# Patient Record
Sex: MALE | Race: BLACK OR AFRICAN AMERICAN | NOT HISPANIC OR LATINO
[De-identification: names, ages, dates, MRNs, and addresses within clinical notes are randomized per-mention and may not be internally consistent; named-entity substitution may affect disease eponyms.]

---

## 2019-08-27 ENCOUNTER — APPOINTMENT (OUTPATIENT)
Dept: NEUROLOGY | Facility: CLINIC | Age: 39
End: 2019-08-27
Payer: MEDICAID

## 2019-08-27 VITALS
WEIGHT: 190 LBS | HEART RATE: 78 BPM | BODY MASS INDEX: 28.14 KG/M2 | SYSTOLIC BLOOD PRESSURE: 120 MMHG | DIASTOLIC BLOOD PRESSURE: 78 MMHG | HEIGHT: 69 IN | OXYGEN SATURATION: 98 %

## 2019-08-27 DIAGNOSIS — Z82.5 FAMILY HISTORY OF ASTHMA AND OTHER CHRONIC LOWER RESPIRATORY DISEASES: ICD-10-CM

## 2019-08-27 DIAGNOSIS — Z87.891 PERSONAL HISTORY OF NICOTINE DEPENDENCE: ICD-10-CM

## 2019-08-27 PROBLEM — Z00.00 ENCOUNTER FOR PREVENTIVE HEALTH EXAMINATION: Status: ACTIVE | Noted: 2019-08-27

## 2019-08-27 PROCEDURE — 99204 OFFICE O/P NEW MOD 45 MIN: CPT

## 2019-08-27 NOTE — CONSULT LETTER
[Dear  ___] : Dear  [unfilled], [Referral Letter:] : I am referring [unfilled] to you for further evaluation.  My most recent evaluation follows. [Referral Closing:] : Thank you very much for seeing this patient.  If you have any questions, please do not hesitate to contact me. [Sincerely,] : Sincerely, [FreeTextEntry3] : Osman Sam MD\par  Professor of Neurology\par  Director of Neurology Outreach Programs \par  [facility]\par

## 2019-08-27 NOTE — HISTORY OF PRESENT ILLNESS
[FreeTextEntry1] : Pr with damage to left ankle / foot \par  Had MRI head - reading says r/o MS \par  No neuro symptoms

## 2019-08-27 NOTE — PHYSICAL EXAM
[FreeTextEntry1] : Awake  and alert  [General Appearance - Alert] : alert [General Appearance - In No Acute Distress] : in no acute distress [Oriented To Time, Place, And Person] : oriented to person, place, and time [Impaired Insight] : insight and judgment were intact [Affect] : the affect was normal [Person] : oriented to person [Place] : oriented to place [Time] : oriented to time [Concentration Intact] : normal concentrating ability [Visual Intact] : visual attention was ~T not ~L decreased [Naming Objects] : no difficulty naming common objects [Repeating Phrases] : no difficulty repeating a phrase [Writing A Sentence] : no difficulty writing a sentence [Fluency] : fluency intact [Comprehension] : comprehension intact [Reading] : reading intact [Past History] : adequate knowledge of personal past history [Cranial Nerves Optic (II)] : visual acuity intact bilaterally,  visual fields full to confrontation, pupils equal round and reactive to light [Cranial Nerves Oculomotor (III)] : extraocular motion intact [Cranial Nerves Trigeminal (V)] : facial sensation intact symmetrically [Cranial Nerves Vestibulocochlear (VIII)] : hearing was intact bilaterally [Cranial Nerves Facial (VII)] : face symmetrical [Cranial Nerves Glossopharyngeal (IX)] : tongue and palate midline [Cranial Nerves Accessory (XI - Cranial And Spinal)] : head turning and shoulder shrug symmetric [Cranial Nerves Hypoglossal (XII)] : there was no tongue deviation with protrusion [Motor Tone] : muscle tone was normal in all four extremities [Motor Strength] : muscle strength was normal in all four extremities [No Muscle Atrophy] : normal bulk in all four extremities [Sensation Tactile Decrease] : light touch was intact [Abnormal Walk] : normal gait [Balance] : balance was intact [Past-pointing] : there was no past-pointing [Tremor] : no tremor present [2+] : Ankle jerk left 2+ [Plantar Reflex Right Only] : normal on the right [Plantar Reflex Left Only] : normal on the left [FreeTextEntry8] : Gait abnormal - using crutches with bandage on the left foot [Sclera] : the sclera and conjunctiva were normal [PERRL With Normal Accommodation] : pupils were equal in size, round, reactive to light, with normal accommodation [Extraocular Movements] : extraocular movements were intact [Outer Ear] : the ears and nose were normal in appearance [Oropharynx] : the oropharynx was normal [Neck Appearance] : the appearance of the neck was normal [Neck Cervical Mass (___cm)] : no neck mass was observed [Jugular Venous Distention Increased] : there was no jugular-venous distention [Thyroid Nodule] : there were no palpable thyroid nodules [Thyroid Diffuse Enlargement] : the thyroid was not enlarged [Full Pulse] : the pedal pulses are present [Edema] : there was no peripheral edema [No CVA Tenderness] : no ~M costovertebral angle tenderness [No Spinal Tenderness] : no spinal tenderness

## 2019-09-03 ENCOUNTER — OUTPATIENT (OUTPATIENT)
Dept: OUTPATIENT SERVICES | Facility: HOSPITAL | Age: 39
LOS: 1 days | End: 2019-09-03

## 2019-09-03 ENCOUNTER — APPOINTMENT (OUTPATIENT)
Dept: MRI IMAGING | Facility: CLINIC | Age: 39
End: 2019-09-03
Payer: MEDICAID

## 2019-09-03 PROCEDURE — 72141 MRI NECK SPINE W/O DYE: CPT | Mod: 26

## 2019-09-03 PROCEDURE — 72146 MRI CHEST SPINE W/O DYE: CPT | Mod: 26

## 2019-09-07 ENCOUNTER — LABORATORY RESULT (OUTPATIENT)
Age: 39
End: 2019-09-07

## 2019-09-11 LAB
ALBUMIN MFR SERPL ELPH: 58.4 %
ALBUMIN SERPL-MCNC: 4.5 G/DL
ALBUMIN/GLOB SERPL: 1.4 RATIO
ALPHA1 GLOB MFR SERPL ELPH: 3.8 %
ALPHA1 GLOB SERPL ELPH-MCNC: 0.3 G/DL
ALPHA2 GLOB MFR SERPL ELPH: 7.3 %
ALPHA2 GLOB SERPL ELPH-MCNC: 0.6 G/DL
B BURGDOR IGG+IGM SER QL IB: NORMAL
B-GLOBULIN MFR SERPL ELPH: 11.1 %
B-GLOBULIN SERPL ELPH-MCNC: 0.9 G/DL
DEPRECATED KAPPA LC FREE/LAMBDA SER: 1.77 RATIO
GAMMA GLOB FLD ELPH-MCNC: 1.5 G/DL
GAMMA GLOB MFR SERPL ELPH: 19.4 %
IGA SER QL IEP: 226 MG/DL
IGG SER QL IEP: 1584 MG/DL
IGM SER QL IEP: 108 MG/DL
INTERPRETATION SERPL IEP-IMP: NORMAL
KAPPA LC CSF-MCNC: 1.41 MG/DL
KAPPA LC SERPL-MCNC: 2.5 MG/DL
M PROTEIN SPEC IFE-MCNC: NORMAL
PROT SERPL-MCNC: 7.7 G/DL
PROT SERPL-MCNC: 7.7 G/DL

## 2019-09-16 ENCOUNTER — APPOINTMENT (OUTPATIENT)
Dept: NEUROLOGY | Facility: CLINIC | Age: 39
End: 2019-09-16
Payer: MEDICAID

## 2019-09-16 VITALS
BODY MASS INDEX: 28.14 KG/M2 | HEART RATE: 71 BPM | OXYGEN SATURATION: 96 % | WEIGHT: 190 LBS | DIASTOLIC BLOOD PRESSURE: 84 MMHG | SYSTOLIC BLOOD PRESSURE: 124 MMHG | HEIGHT: 69 IN

## 2019-09-16 PROCEDURE — 99213 OFFICE O/P EST LOW 20 MIN: CPT

## 2019-09-16 NOTE — ASSESSMENT
[FreeTextEntry1] : with findings on blood studies - r/o MS vs autoimmune disease \par \par  He will be admitted to [facility] for w/o

## 2019-09-16 NOTE — PHYSICAL EXAM
[General Appearance - In No Acute Distress] : in no acute distress [General Appearance - Alert] : alert [Oriented To Time, Place, And Person] : oriented to person, place, and time [Impaired Insight] : insight and judgment were intact [Person] : oriented to person [Affect] : the affect was normal [Place] : oriented to place [Time] : oriented to time [Concentration Intact] : normal concentrating ability [Visual Intact] : visual attention was ~T not ~L decreased [Naming Objects] : no difficulty naming common objects [Repeating Phrases] : no difficulty repeating a phrase [Writing A Sentence] : no difficulty writing a sentence [Fluency] : fluency intact [Comprehension] : comprehension intact [Reading] : reading intact [Past History] : adequate knowledge of personal past history [Cranial Nerves Optic (II)] : visual acuity intact bilaterally,  visual fields full to confrontation, pupils equal round and reactive to light [Cranial Nerves Oculomotor (III)] : extraocular motion intact [Cranial Nerves Trigeminal (V)] : facial sensation intact symmetrically [Cranial Nerves Facial (VII)] : face symmetrical [Cranial Nerves Vestibulocochlear (VIII)] : hearing was intact bilaterally [Cranial Nerves Glossopharyngeal (IX)] : tongue and palate midline [Cranial Nerves Accessory (XI - Cranial And Spinal)] : head turning and shoulder shrug symmetric [Cranial Nerves Hypoglossal (XII)] : there was no tongue deviation with protrusion [Motor Tone] : muscle tone was normal in all four extremities [Motor Strength] : muscle strength was normal in all four extremities [No Muscle Atrophy] : normal bulk in all four extremities [Sensation Tactile Decrease] : light touch was intact [Abnormal Walk] : normal gait [Balance] : balance was intact [Past-pointing] : there was no past-pointing [Tremor] : no tremor present [2+] : Ankle jerk left 2+ [Plantar Reflex Right Only] : normal on the right [Plantar Reflex Left Only] : normal on the left [Neck Appearance] : the appearance of the neck was normal [Jugular Venous Distention Increased] : there was no jugular-venous distention [Neck Cervical Mass (___cm)] : no neck mass was observed [Thyroid Diffuse Enlargement] : the thyroid was not enlarged [FreeTextEntry1] : Gait - Left foot brace  [Thyroid Nodule] : there were no palpable thyroid nodules

## 2019-09-17 ENCOUNTER — INPATIENT (INPATIENT)
Facility: HOSPITAL | Age: 39
LOS: 1 days | Discharge: HOME | DRG: 59 | End: 2019-09-19
Attending: INTERNAL MEDICINE | Admitting: INTERNAL MEDICINE
Payer: MEDICAID

## 2019-09-17 ENCOUNTER — RESULT REVIEW (OUTPATIENT)
Age: 39
End: 2019-09-17

## 2019-09-17 VITALS
OXYGEN SATURATION: 98 % | WEIGHT: 190.04 LBS | RESPIRATION RATE: 18 BRPM | SYSTOLIC BLOOD PRESSURE: 138 MMHG | HEART RATE: 76 BPM | TEMPERATURE: 98 F | DIASTOLIC BLOOD PRESSURE: 70 MMHG

## 2019-09-17 DIAGNOSIS — M35.00 SJOGREN SYNDROME, UNSPECIFIED: ICD-10-CM

## 2019-09-17 DIAGNOSIS — S02.609A FRACTURE OF MANDIBLE, UNSPECIFIED, INITIAL ENCOUNTER FOR CLOSED FRACTURE: Chronic | ICD-10-CM

## 2019-09-17 DIAGNOSIS — Z29.9 ENCOUNTER FOR PROPHYLACTIC MEASURES, UNSPECIFIED: ICD-10-CM

## 2019-09-17 DIAGNOSIS — Z91.89 OTHER SPECIFIED PERSONAL RISK FACTORS, NOT ELSEWHERE CLASSIFIED: ICD-10-CM

## 2019-09-17 DIAGNOSIS — S92.902A UNSPECIFIED FRACTURE OF LEFT FOOT, INITIAL ENCOUNTER FOR CLOSED FRACTURE: ICD-10-CM

## 2019-09-17 LAB
ALBUMIN SERPL ELPH-MCNC: 4.6 G/DL — SIGNIFICANT CHANGE UP (ref 3.3–5)
ALP SERPL-CCNC: 71 U/L — SIGNIFICANT CHANGE UP (ref 40–120)
ALT FLD-CCNC: 27 U/L — SIGNIFICANT CHANGE UP (ref 10–45)
ANA PAT FLD IF-IMP: ABNORMAL
ANA SER IF-ACNC: ABNORMAL
ANION GAP SERPL CALC-SCNC: 10 MMOL/L — SIGNIFICANT CHANGE UP (ref 5–17)
APPEARANCE CSF: CLEAR — SIGNIFICANT CHANGE UP
APPEARANCE SPUN FLD: COLORLESS — SIGNIFICANT CHANGE UP
APTT BLD: 34.1 SEC — SIGNIFICANT CHANGE UP (ref 27.5–36.3)
AQP4 H2O CHANNEL AB SERPL IA-ACNC: NEGATIVE
AST SERPL-CCNC: 27 U/L — SIGNIFICANT CHANGE UP (ref 10–40)
BASOPHILS # BLD AUTO: 0.03 K/UL — SIGNIFICANT CHANGE UP (ref 0–0.2)
BASOPHILS NFR BLD AUTO: 0.5 % — SIGNIFICANT CHANGE UP (ref 0–2)
BILIRUB SERPL-MCNC: 0.4 MG/DL — SIGNIFICANT CHANGE UP (ref 0.2–1.2)
BUN SERPL-MCNC: 9 MG/DL — SIGNIFICANT CHANGE UP (ref 7–23)
CALCIUM SERPL-MCNC: 9.4 MG/DL — SIGNIFICANT CHANGE UP (ref 8.4–10.5)
CHLORIDE SERPL-SCNC: 104 MMOL/L — SIGNIFICANT CHANGE UP (ref 96–108)
CO2 SERPL-SCNC: 25 MMOL/L — SIGNIFICANT CHANGE UP (ref 22–31)
COLOR CSF: SIGNIFICANT CHANGE UP
CREAT SERPL-MCNC: 0.83 MG/DL — SIGNIFICANT CHANGE UP (ref 0.5–1.3)
CSF PCR RESULT: SIGNIFICANT CHANGE UP
ENA SS-A AB SER IA-ACNC: 0.5 AL
ENA SS-B AB SER IA-ACNC: 1.5 AL
EOSINOPHIL # BLD AUTO: 0.18 K/UL — SIGNIFICANT CHANGE UP (ref 0–0.5)
EOSINOPHIL NFR BLD AUTO: 2.9 % — SIGNIFICANT CHANGE UP (ref 0–6)
EXTRA SST TUBE: SIGNIFICANT CHANGE UP
GLUCOSE CSF-MCNC: 60 MG/DL — SIGNIFICANT CHANGE UP (ref 40–70)
GLUCOSE SERPL-MCNC: 94 MG/DL — SIGNIFICANT CHANGE UP (ref 70–99)
GRAM STN FLD: SIGNIFICANT CHANGE UP
HCT VFR BLD CALC: 46 % — SIGNIFICANT CHANGE UP (ref 39–50)
HGB BLD-MCNC: 15.1 G/DL — SIGNIFICANT CHANGE UP (ref 13–17)
IMM GRANULOCYTES NFR BLD AUTO: 0.2 % — SIGNIFICANT CHANGE UP (ref 0–1.5)
INR BLD: 1.05 — SIGNIFICANT CHANGE UP (ref 0.88–1.16)
LYMPHOCYTES # BLD AUTO: 2.48 K/UL — SIGNIFICANT CHANGE UP (ref 1–3.3)
LYMPHOCYTES # BLD AUTO: 39.7 % — SIGNIFICANT CHANGE UP (ref 13–44)
MCHC RBC-ENTMCNC: 29.5 PG — SIGNIFICANT CHANGE UP (ref 27–34)
MCHC RBC-ENTMCNC: 32.8 GM/DL — SIGNIFICANT CHANGE UP (ref 32–36)
MCV RBC AUTO: 90 FL — SIGNIFICANT CHANGE UP (ref 80–100)
MONOCYTES # BLD AUTO: 0.59 K/UL — SIGNIFICANT CHANGE UP (ref 0–0.9)
MONOCYTES NFR BLD AUTO: 9.5 % — SIGNIFICANT CHANGE UP (ref 2–14)
NEUTROPHILS # BLD AUTO: 2.95 K/UL — SIGNIFICANT CHANGE UP (ref 1.8–7.4)
NEUTROPHILS # CSF: 0 % — SIGNIFICANT CHANGE UP (ref 0–6)
NEUTROPHILS NFR BLD AUTO: 47.2 % — SIGNIFICANT CHANGE UP (ref 43–77)
NRBC # BLD: 0 /100 WBCS — SIGNIFICANT CHANGE UP (ref 0–0)
NRBC NFR CSF: 0 /UL — SIGNIFICANT CHANGE UP (ref 0–5)
PCP SPEC-MCNC: SIGNIFICANT CHANGE UP
PLATELET # BLD AUTO: 276 K/UL — SIGNIFICANT CHANGE UP (ref 150–400)
POTASSIUM SERPL-MCNC: 4.3 MMOL/L — SIGNIFICANT CHANGE UP (ref 3.5–5.3)
POTASSIUM SERPL-SCNC: 4.3 MMOL/L — SIGNIFICANT CHANGE UP (ref 3.5–5.3)
PROT CSF-MCNC: 38 MG/DL — SIGNIFICANT CHANGE UP (ref 15–45)
PROT SERPL-MCNC: 8.3 G/DL — SIGNIFICANT CHANGE UP (ref 6–8.3)
PROTHROM AB SERPL-ACNC: 11.9 SEC — SIGNIFICANT CHANGE UP (ref 10–12.9)
RBC # BLD: 5.11 M/UL — SIGNIFICANT CHANGE UP (ref 4.2–5.8)
RBC # CSF: 2 /UL — HIGH (ref 0–0)
RBC # FLD: 12.2 % — SIGNIFICANT CHANGE UP (ref 10.3–14.5)
SODIUM SERPL-SCNC: 139 MMOL/L — SIGNIFICANT CHANGE UP (ref 135–145)
SPECIMEN SOURCE: SIGNIFICANT CHANGE UP
TUBE TYPE: SIGNIFICANT CHANGE UP
WBC # BLD: 6.24 K/UL — SIGNIFICANT CHANGE UP (ref 3.8–10.5)
WBC # FLD AUTO: 6.24 K/UL — SIGNIFICANT CHANGE UP (ref 3.8–10.5)

## 2019-09-17 PROCEDURE — 62270 DX LMBR SPI PNXR: CPT

## 2019-09-17 PROCEDURE — 99283 EMERGENCY DEPT VISIT LOW MDM: CPT

## 2019-09-17 PROCEDURE — 99222 1ST HOSP IP/OBS MODERATE 55: CPT | Mod: GC

## 2019-09-17 PROCEDURE — 99222 1ST HOSP IP/OBS MODERATE 55: CPT

## 2019-09-17 PROCEDURE — 77003 FLUOROGUIDE FOR SPINE INJECT: CPT | Mod: 26

## 2019-09-17 RX ORDER — NICOTINE POLACRILEX 2 MG
0 GUM BUCCAL
Qty: 0 | Refills: 0 | DISCHARGE

## 2019-09-17 RX ORDER — NICOTINE POLACRILEX 2 MG
2 GUM BUCCAL THREE TIMES A DAY
Refills: 0 | Status: DISCONTINUED | OUTPATIENT
Start: 2019-09-17 | End: 2019-09-19

## 2019-09-17 RX ORDER — ONDANSETRON 8 MG/1
4 TABLET, FILM COATED ORAL ONCE
Refills: 0 | Status: COMPLETED | OUTPATIENT
Start: 2019-09-17 | End: 2019-09-17

## 2019-09-17 RX ORDER — ACETAMINOPHEN 500 MG
650 TABLET ORAL EVERY 6 HOURS
Refills: 0 | Status: DISCONTINUED | OUTPATIENT
Start: 2019-09-17 | End: 2019-09-19

## 2019-09-17 RX ORDER — METOCLOPRAMIDE HCL 10 MG
10 TABLET ORAL ONCE
Refills: 0 | Status: COMPLETED | OUTPATIENT
Start: 2019-09-17 | End: 2019-09-17

## 2019-09-17 RX ADMIN — Medication 258 MILLIGRAM(S): at 12:02

## 2019-09-17 RX ADMIN — Medication 650 MILLIGRAM(S): at 18:47

## 2019-09-17 RX ADMIN — Medication 650 MILLIGRAM(S): at 18:42

## 2019-09-17 NOTE — H&P ADULT - HISTORY OF PRESENT ILLNESS
Mr. Gregorio is a 38YO male with PMH of L jaw fracture and wiring ~2012 and prior alcohol abuse (now in treatment) with withdrawal and 1 seizure who presented to the ED after being sent by Dr. Sam who saw him outpatient for brain lesions incidentally found on head CT in [facility] for a L foot fracture August 2019. 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 Other than the L foot fracture, he denied weakness, sensory loss, tingling, numbness, changes in vision, double vision, changes with urination, changes in mood, or any other symptoms. Endorsed mild nausea for the past 2-3 days. Otherwise ROS similarly negative.    In the ED VS T 98F, HR 73, /71, RR 18 with SpO2 98 on RA  Labs were all within normal limits.    He was started on 1g IV solumedrol and admitted for inpatient LP and workup.

## 2019-09-17 NOTE — ED PROVIDER NOTE - PHYSICAL EXAMINATION
VITAL SIGNS: I have reviewed nursing notes and confirm.  CONSTITUTIONAL: Well-developed; well-nourished; in no acute distress.  SKIN: Skin is warm and dry, no acute rash.  HEAD: Normocephalic; atraumatic.  EYES:  EOM intact; conjunctiva and sclera clear.  ENT: No nasal discharge; airway clear.  NECK: Supple; Voluntary FROM  CARD: well perfused Regular rate and rhythm.  RESP: Speaking full sentences, No respiratory distress  ABD: Soft; non-distended; non-tender; no rebound or guarding  EXT: Normal ROM. No cyanosis or edema.  NEURO: Alert, oriented. Grossly unremarkable.  PSYCH: Cooperative, appropriate.

## 2019-09-17 NOTE — H&P ADULT - NSHPSOCIALHISTORY_GEN_ALL_CORE
Former smoker 25 years, single cigarettes  Ocassional prior marijuana use  Former EtOH abuse, currently living and working in program for alcoholism tx  Denies current or former recreational drug use  No STIs, not currently sexually active Former smoker 25 years, single cigarettes  Occasional prior marijuana use  Former EtOH abuse, currently living and working in program for alcoholism tx  Denies current or former recreational drug use  No STIs, not currently sexually active

## 2019-09-17 NOTE — CONSULT NOTE ADULT - SUBJECTIVE AND OBJECTIVE BOX
39 year old male with alcohol abuse (now and treatment) and withdrawal now noted to have demyelinating lesions on MRI.    INCOMPLETE NOTE     SSA and ARELIS 1:320 as outpatient     ROS    PMH  PSH  SH  FH  Meds  All     VSS  Physical exam notable for the following pertinent positives/negatives:     Data reviewed, notable for 39 year old male with alcohol abuse (now and treatment) and withdrawal now noted to have demyelinating lesions on MRI. Patient underwent a work up by his outpatient neurologist, Dr. Sam, which revealed ARELIS 1:320 and SSB 1.5  Patient unable to be seen by me today as he was at IR undergoing a lumbar puncture. Will see him tomorrow to obtain further history.   Patient admitted today for an LP and pulse steroids.     ROS negative per chart review    PMH alcohol abuse and withdrawal   PSH jaw fracture repair   SH occasional marijuana, former EtOH abuse, denies IVDU   FH maternal grandmother w/ brain aneurysm   Meds started 1g medrol today, see med rec for additional   All NKDA     VSS  Physical exam notable for the following pertinent positives/negatives:   Unable to examined patient today, in IR - will see tomorrow     Data reviewed, notable for:  CBC WNL   CMP WNL   TP 8.3 Alb 4.6   LFTs WNL   MRI C Spine: foci of abnormal T2 prolongation in the intramedullary intradural cervical spinal cord at the C2 level inferiorly and mid C4 level suggestive of demyelinating disease such as multiple sclerosis. GERRY.

## 2019-09-17 NOTE — ED ADULT TRIAGE NOTE - CHIEF COMPLAINT QUOTE
" I was sent by MD Sam for a lumbar puncture, I had imaging which showed some lesions and that's why I was told to follow up with him"

## 2019-09-17 NOTE — ED PROVIDER NOTE - CLINICAL SUMMARY MEDICAL DECISION MAKING FREE TEXT BOX
Cecy discussed w/ terrell, recs admit serena, 1gm solumedrol qday, further eval pending. Concern MS, MM, rheumatoid disease, not consistent w/ stroke, ich, no e/o severe infection, sepsis, meningitis. Rheum to be inpt consult. No acute findings on exam. 39M pmh etoh abuse and sz, seen for outpt eval by Neuro w/ brain lesions on imaging, hx L foot fx and L jaw fx p/w brain lesion.   No n/v, no HA, no vision change, no cp, no sob, no unilateral weakness or numbness. No known hx of demyelinating disease. Cecy discussed w/ racanalli, recs admit serena, 1gm solumedrol qday, further eval pending. Concern MS, MM, rheumatoid disease, not consistent w/ stroke, ich, no e/o severe infection, sepsis, meningitis. Rheum to be inpt consult. No acute findings on exam.

## 2019-09-17 NOTE — H&P ADULT - PROBLEM SELECTOR PLAN 4
1) PCP   Contacted on Admission:  2) Date of Contact with PCP:  3) PCP Contacted at Discharge: (Y/N)  4) Summary of Handoff Given to PCP:   5) Post-Discharge Appointment Date and Location: 1) PCP - no PCP at this time  Contacted on Admission:  2) Date of Contact with PCP:  3) PCP Contacted at Discharge: (Y/N)  4) Summary of Handoff Given to PCP:   5) Post-Discharge Appointment Date and Location:

## 2019-09-17 NOTE — CONSULT NOTE ADULT - ASSESSMENT
39 year old male with alcohol abuse (now and treatment) and withdrawal now noted to have demyelinating lesions on MRI. Patient underwent a work up by his outpatient neurologist, Dr. Sam, which revealed ARELIS 1:320 and SSB 1.5  Patient unable to be seen by me today as he was at IR undergoing a lumbar puncture. Will see him tomorrow to obtain further history.   Patient admitted today for an LP and pulse steroids.   Unable to examined patient today for more systemic signs of sjogren's syndrome.   His labs are not suggestive at this time of any active disease, with no protein gap or lymphopenia/leukopenia.     Would re check other markers of disease at this time including: IgA/IgG/IgM, C3/C4, and re check ARELIS, ssa/ssb, COREY, dsDNA, IgG4 subtypes, ACE, 25OHD, 1,25OHD  In order to make a definitive diagnosis of sjogren's prior to committing him to long term immunosuppressive therapy for treatment of these demyelinating lesions secondary to sjogren's, would suggest a lip biopsy to obtain a focus score   Agree with LP to evaluate for elevated protein and lymphocytic pleocytosis   OK to continue pulse for treatment of demyelinating lesions on MRI if no concern for infectious cause, but at this time would not make a presumptive diagnosis of Sjogren's syndrome based on the available data.     D/w Dr. Sam   Call with questions

## 2019-09-17 NOTE — H&P ADULT - PROBLEM SELECTOR PLAN 1
- IR consulted for LP  - Neurology consulted  - Rheumatology consulted Incidental lesions on CT at OSH, with subsequent findings on MRI suspicous for demyelinating process. Has been following with Dr. Sam as outpatient neurologist.   - started on IV solumedrol upon admission  - Neurology (Dr. Sam) following as inpatient  - IR performed LP after admission  - Rheumatology consulted  - f/u CSF results  - f/u rheumatology recommendations

## 2019-09-17 NOTE — ED PROVIDER NOTE - OBJECTIVE STATEMENT
39M pmh etoh abuse and sz, seen for outpt eval by Neuro w/ brain lesions on imaging, hx L foot fx and L jaw fx p/w brain lesion.   No n/v, no HA, no vision change, no cp, no sob, no unilateral weakness or numbness. No known hx of demyelinating disease.

## 2019-09-17 NOTE — H&P ADULT - PROBLEM SELECTOR PLAN 3
F: none  E: replete as needed for K <4, Mg <2  N: ____ diet   GI ppx: N/A  VTE/DVT ppx:  Pain:   Code status:  Dispo: F: none  E: replete as needed for K <4, Mg <2  N: Regular diet   GI ppx: N/A  VTE/DVT ppx: N/A  Pain: tylenol PRN  Code status: Full code  Dispo: GILBERTO

## 2019-09-17 NOTE — ED ADULT NURSE NOTE - OBJECTIVE STATEMENT
Patient presents to the ED sent for admission by Dr. Sam.  Patient states that he fractured his heel to his left foot a few months ago after a fall and they did a scan of his head which showed some lesions in his brain.  Patient states that he followed up with neurology and now they want him to get a spinal tap.  Patient denies any numbness/tingling weakness.  AA&OX3, respirations unlabored, non-diaphoretic, no pallor.  walking ortho boot to left foot.  Patient walking with a cane.

## 2019-09-17 NOTE — CHART NOTE - NSCHARTNOTEFT_GEN_A_CORE
Procedure: lumbar puncture    Indication: abnormal head CT, s/p fall with left leg fx      Clinical History:  38 yo M with of recent fall s/p left leg fx and with abnormal head imaging (?) who is referred to IR for lumbar puncture    Meds:    Allergies:No Known Allergies        Labs:                           15.1   6.24  )-----------( 276      ( 17 Sep 2019 11:49 )             46.0     PT/INR - ( 17 Sep 2019 11:49 )   PT: 11.9 sec;   INR: 1.05          PTT - ( 17 Sep 2019 11:49 )  PTT:34.1 sec  09-17    139  |  104  |  9   ----------------------------<  94  4.3   |  25  |  0.83    Ca    9.4      17 Sep 2019 11:49    TPro  8.3  /  Alb  4.6  /  TBili  0.4  /  DBili  x   /  AST  27  /  ALT  27  /  AlkPhos  71  09-17        Physical Exam:  AAO, conversant and consentable  Dysphagia (slight)  lef leg foot boot noted  NAD on RA    Anesthesia: lidocaine    ASA: 1    NPO: no

## 2019-09-17 NOTE — ED PROVIDER NOTE - NOTES
At bedside, gives add'l hx & access to [employer], notes multiple demyelinating lesions & lab abnormalities. Final recs pending. [phone]

## 2019-09-17 NOTE — H&P ADULT - ASSESSMENT
Mr. Gregorio is a 38YO male with no relevant PMH who presents asymptomatically with findings on CT and MRI suspicious for demyelinating disorder found in the setting of a L foot fracture. Mr. Gregorio is a 40YO male with no relevant PMH who presents asymptomatically with findings on CT and MRI suspicious for demyelinating disorder vs other found in the setting of a L foot fracture. Mr. Gregorio is a 38YO male with no relevant PMH who presents asymptomatically with incidental findings on CT and MRI suspicious for demyelinating disorder vs other in the setting of a L foot fracture.

## 2019-09-17 NOTE — H&P ADULT - PROBLEM SELECTOR PLAN 2
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 Could no give exact date or name of fracture. He was seen at [facility] and had a boot placed, no surgical intervention.  - f/u foot and ankle xrays  - consult orthopedics

## 2019-09-17 NOTE — H&P ADULT - NSHPPHYSICALEXAM_GEN_ALL_CORE
PHYSICAL EXAM  General: resting comfortably in bed in NAD  Mental Status: awake, A&O to person, place, date, and situation  HEENT: NC/AT, no rhinorrhea, no oropharyngeal exudates or erythema, MMM  Neck: supple, no JVD b/l  Cardiovascular: +S1/S2 with RRR. no M/R/G appreciated  Respiratory: CTA b/l, no W/R/R appreciated, no respiratory distress or accessory muscle use  Abdominal: +BS x4, NT/ND, no rigidity, no guarding,   Genitourinary: deferred   Extremities: UE: WWP, no clubbing, no cyanosis. LE: no edema, symmetric, non-tender  Vascular: 2+ radial pulses b/l; 1+ DP pulses b/l  Neurological: PERRL, EOMI with both horizontal and vertical nystagmus noted, CN II-XII grossly intact, L facial twitch   - Motor: 5/5 UE b/l. LE 5/5 RLE, LLE proximal 5/5 distal unable to fully examine as wearing boot.   Sensory: intact and equal to light touch UE & LE b/l.     Psychiatric: verbalizations and behaviors are nonbizarre and appropriate  Dermatological: skin dry and intact, no rashes, bruising, scars appreciated  Lymphatic: no submandibular or cervical LAD appreciated

## 2019-09-17 NOTE — CONSULT NOTE ADULT - SUBJECTIVE AND OBJECTIVE BOX
Patient is a 39y old  Male who presents with a chief complaint of left leg symptoms - s/p Fx    abnormal blood and MRI head       NO new numbness tingling visual  symptoms        Allergies  No Known Allergies      Health Issues  WEAKNESS  MEWS Score  No pertinent past medical history  Weakness  MED EVAL        FAMILY HISTORY:      MEDICATIONS  (STANDING):  methylPREDNISolone sodium succinate IVPB 1000 milliGRAM(s) IV Intermittent Once    MEDICATIONS  (PRN):      PAST MEDICAL & SURGICAL HISTORY:  No pertinent past medical history      Labs              Radiology:    Physical Exam    MENTAL STATUS  -Level of Consciousness- awake    Orientation- person, place time  Language- aphasia/ dysarthria- alert   Memory- recent and remote- nl      Cranial Nerve 1- 12  Pupils- equal and reactive  Eye movements- full   Facial - no asymmetry   Lower CN-nl    Gait and Station- no foot drop    MOTOR  Upper-nl  Lower-nl    Reflexes- nl    Sensation- nl    Cerebellar- nl    vascular - no bruits    Assessment- R/o MS     Plan IV solumedrol , LP

## 2019-09-17 NOTE — H&P ADULT - NSHPLABSRESULTS_GEN_ALL_CORE
LABS:              15.1   6.24  )-----------( 276      ( 17 Sep 2019 11:49 )             46.0     09-17  139  |  104  |  9   ----------------------------<  94  4.3   |  25  |  0.83    Ca    9.4      17 Sep 2019 11:49    TPro  8.3  /  Alb  4.6  /  TBili  0.4  /  DBili  x   /  AST  27  /  ALT  27  /  AlkPhos  71  09-17    PT/INR - ( 17 Sep 2019 11:49 )   PT: 11.9 sec;   INR: 1.05     PTT - ( 17 Sep 2019 11:49 )  PTT:34.1 sec      RADIOLOGY, EKG & ADDITIONAL TESTS: Reviewed.      < from: MR Cervical Spine No Cont (09.03.19 @ 17:41) >    IMPRESSION:    1. Foci of abnormal T2 prolongation in the intramedullary intradural   cervical spinal cord at the C2 level inferiorly and mid C4 level    suggestive of demyelinating disease such as multiple sclerosis.   Postcontrast MR imaging is recommended to assess for active demyelination.  2. Multilevel degenerative disc disease including broad-based central   disc herniation C6-C7 level and diffuse disc-osteophyte complex from   C3-C4 through to the C5-C6 level inclusive.  3. No central canal stenosis.  4. Multilevel foraminal narrowing, as above.  5. Reversal of normal cervical lordosis.    < end of copied text > LABS:              15.1   6.24  )-----------( 276      ( 17 Sep 2019 11:49 )             46.0     09-17  139  |  104  |  9   ----------------------------<  94  4.3   |  25  |  0.83    Ca    9.4      17 Sep 2019 11:49    TPro  8.3  /  Alb  4.6  /  TBili  0.4  /  DBili  x   /  AST  27  /  ALT  27  /  AlkPhos  71  09-17    PT/INR - ( 17 Sep 2019 11:49 )   PT: 11.9 sec;   INR: 1.05     PTT - ( 17 Sep 2019 11:49 )  PTT:34.1 sec      RADIOLOGY, EKG & ADDITIONAL TESTS: Reviewed.      < from: MR Cervical Spine No Cont (09.03.19 @ 17:41) >    IMPRESSION:    1. Foci of abnormal T2 prolongation in the intramedullary intradural   cervical spinal cord at the C2 level inferiorly and mid C4 level    suggestive of demyelinating disease such as multiple sclerosis.   Postcontrast MR imaging is recommended to assess for active demyelination.  2. Multilevel degenerative disc disease including broad-based central   disc herniation C6-C7 level and diffuse disc-osteophyte complex from   C3-C4 through to the C5-C6 level inclusive.  3. No central canal stenosis.  4. Multilevel foraminal narrowing, as above.  5. Reversal of normal cervical lordosis.

## 2019-09-17 NOTE — CHART NOTE - NSCHARTNOTEFT_GEN_A_CORE
PROCEDURE: Image guided lumbar puncture    PRIMARY SURGEON: Dr. Álvarez    ASSISTANT: Dr. Mitchell and Dr. Trejo    INDICATION: Multiple sclerosis/ demyelinating disorder    EBL: 0cc    ANESTHESIA: local     COMPLICATIONS: No immediate complications.     SPECIMENS: 5 vials of CSF     FINDINGS: Clear CSF     GENERAL CONDITION OF PATIENT: NAD, Alert    POSTOPERATIVE DIAGNOSIS: Lumbar puncture    DISPOSITION: Emergency room

## 2019-09-18 ENCOUNTER — TRANSCRIPTION ENCOUNTER (OUTPATIENT)
Age: 39
End: 2019-09-18

## 2019-09-18 LAB
24R-OH-CALCIDIOL SERPL-MCNC: 23.3 NG/ML — LOW (ref 30–80)
ANION GAP SERPL CALC-SCNC: 8 MMOL/L — SIGNIFICANT CHANGE UP (ref 5–17)
ANTI-RIBONUCLEAR PROTEIN: 0.5 AI — SIGNIFICANT CHANGE UP
BUN SERPL-MCNC: 14 MG/DL — SIGNIFICANT CHANGE UP (ref 7–23)
C3 SERPL-MCNC: 127 MG/DL — SIGNIFICANT CHANGE UP (ref 81–157)
C4 SERPL-MCNC: 27 MG/DL — SIGNIFICANT CHANGE UP (ref 13–39)
CALCIUM SERPL-MCNC: 9 MG/DL — SIGNIFICANT CHANGE UP (ref 8.4–10.5)
CHLORIDE SERPL-SCNC: 103 MMOL/L — SIGNIFICANT CHANGE UP (ref 96–108)
CO2 SERPL-SCNC: 26 MMOL/L — SIGNIFICANT CHANGE UP (ref 22–31)
CREAT SERPL-MCNC: 0.81 MG/DL — SIGNIFICANT CHANGE UP (ref 0.5–1.3)
DSDNA AB FLD-ACNC: 1.6 AI — HIGH
ENA SM AB FLD QL: <0.2 AI — SIGNIFICANT CHANGE UP
ENA SS-A AB FLD IA-ACNC: 0.5 AI — SIGNIFICANT CHANGE UP
EXTRA RED TOP TUBE: SIGNIFICANT CHANGE UP
GLUCOSE SERPL-MCNC: 110 MG/DL — HIGH (ref 70–99)
HCT VFR BLD CALC: 40.1 % — SIGNIFICANT CHANGE UP (ref 39–50)
HGB BLD-MCNC: 13.3 G/DL — SIGNIFICANT CHANGE UP (ref 13–17)
IGA FLD-MCNC: 240 MG/DL — SIGNIFICANT CHANGE UP (ref 84–499)
IGG FLD-MCNC: 1716 MG/DL — HIGH (ref 610–1660)
IGM SERPL-MCNC: 116 MG/DL — SIGNIFICANT CHANGE UP (ref 35–242)
KAPPA LC SER QL IFE: 1.89 MG/DL — SIGNIFICANT CHANGE UP (ref 0.33–1.94)
KAPPA/LAMBDA FREE LIGHT CHAIN RATIO, SERUM: 2.03 RATIO — HIGH (ref 0.26–1.65)
LAMBDA LC SER QL IFE: 0.93 MG/DL — SIGNIFICANT CHANGE UP (ref 0.57–2.63)
MCHC RBC-ENTMCNC: 29 PG — SIGNIFICANT CHANGE UP (ref 27–34)
MCHC RBC-ENTMCNC: 33.2 GM/DL — SIGNIFICANT CHANGE UP (ref 32–36)
MCV RBC AUTO: 87.4 FL — SIGNIFICANT CHANGE UP (ref 80–100)
NRBC # BLD: 0 /100 WBCS — SIGNIFICANT CHANGE UP (ref 0–0)
PLATELET # BLD AUTO: 258 K/UL — SIGNIFICANT CHANGE UP (ref 150–400)
POTASSIUM SERPL-MCNC: 3.9 MMOL/L — SIGNIFICANT CHANGE UP (ref 3.5–5.3)
POTASSIUM SERPL-SCNC: 3.9 MMOL/L — SIGNIFICANT CHANGE UP (ref 3.5–5.3)
RBC # BLD: 4.59 M/UL — SIGNIFICANT CHANGE UP (ref 4.2–5.8)
RBC # FLD: 12.3 % — SIGNIFICANT CHANGE UP (ref 10.3–14.5)
SODIUM SERPL-SCNC: 137 MMOL/L — SIGNIFICANT CHANGE UP (ref 135–145)
VIT D25+D1,25 OH+D1,25 PNL SERPL-MCNC: 80.7 PG/ML — HIGH (ref 19.9–79.3)
WBC # BLD: 16.82 K/UL — HIGH (ref 3.8–10.5)
WBC # FLD AUTO: 16.82 K/UL — HIGH (ref 3.8–10.5)

## 2019-09-18 PROCEDURE — 71045 X-RAY EXAM CHEST 1 VIEW: CPT | Mod: 26

## 2019-09-18 PROCEDURE — 99232 SBSQ HOSP IP/OBS MODERATE 35: CPT

## 2019-09-18 RX ADMIN — Medication 516 MILLIGRAM(S): at 11:43

## 2019-09-18 NOTE — PROGRESS NOTE ADULT - ASSESSMENT
Mr. Gregorio is a 40YO male with no relevant PMH who presents asymptomatically with incidental findings on CT and MRI suspicious for demyelinating disorder vs other in the setting of a L foot fracture.

## 2019-09-18 NOTE — PROGRESS NOTE ADULT - SUBJECTIVE AND OBJECTIVE BOX
GAYATRI ZEE  39y  Male    Patient is a 39y old  Male who presents with a chief complaint of w/u for MS/demyelinating d/o (17 Sep 2019 16:41)      INTERVAL HPI/OVERNIGHT EVENTS: Patient interviewed and examined at bedside. Patient has no complaints of weakness, altered sensation, N/V, CP, lightheadedness, or dizziness.     REVIEW OF SYSTEMS:  CONSTITUTIONAL: No fever, weight loss, or fatigue    T(C): 36.4 (09-18-19 @ 09:25), Max: 36.7 (09-17-19 @ 10:25)  HR: 83 (09-18-19 @ 09:25) (73 - 98)  BP: 108/51 (09-18-19 @ 09:25) (108/51 - 138/70)  RR: 16 (09-18-19 @ 09:25) (16 - 18)  SpO2: 96% (09-18-19 @ 09:25) (94% - 98%)  Wt(kg): --Vital Signs Last 24 Hrs  T(C): 36.4 (18 Sep 2019 09:25), Max: 36.7 (17 Sep 2019 10:25)  T(F): 97.5 (18 Sep 2019 09:25), Max: 98 (17 Sep 2019 10:25)  HR: 83 (18 Sep 2019 09:25) (73 - 98)  BP: 108/51 (18 Sep 2019 09:25) (108/51 - 138/70)  BP(mean): --  RR: 16 (18 Sep 2019 09:25) (16 - 18)  SpO2: 96% (18 Sep 2019 09:25) (94% - 98%)    PHYSICAL EXAM:  GENERAL: NAD  HEAD: Atraumatic, Normocephalic  EYES: EOMI, PERRLA, conjunctiva and sclera clear  NERVOUS SYSTEM: Alert & Oriented X3, Good concentration; Motor Strength 5/5 B/L upper and lower extremities; CN II-XII intact  CHEST/LUNG: Clear to auscultation bilaterally; No rales, rhonchi, wheezing, or rubs  HEART: Regular rate and rhythm; No murmurs, rubs, or gallops  ABDOMEN: Soft, Nontender, Nondistended; Bowel sounds present  EXTREMITIES: WWP, No clubbing, cyanosis, or edema. Walking boot on L foot   Vascular: 2+ peripheral pulses x4      Consultant(s) Notes Reviewed:  [x ] YES  [ ] NO  Care Discussed with Consultants/Other Providers [ x] YES  [ ] NO    LABS:                        15.1   6.24  )-----------( 276      ( 17 Sep 2019 11:49 )             46.0     09-17    139  |  104  |  9   ----------------------------<  94  4.3   |  25  |  0.83    Ca    9.4      17 Sep 2019 11:49    TPro  8.3  /  Alb  4.6  /  TBili  0.4  /  DBili  x   /  AST  27  /  ALT  27  /  AlkPhos  71  09-17      PT/INR - ( 17 Sep 2019 11:49 )   PT: 11.9 sec;   INR: 1.05          PTT - ( 17 Sep 2019 11:49 )  PTT:34.1 sec    CAPILLARY BLOOD GLUCOSE                RADIOLOGY & ADDITIONAL TESTS:    Imaging Personally Reviewed:  [ ] YES  [ ] NO    HEALTH ISSUES - PROBLEM Dx:  Transition of care performed with sharing of clinical summary: Transition of care performed with sharing of clinical summary  Prophylactic measure: Prophylactic measure  Foot fracture, left: Foot fracture, left

## 2019-09-18 NOTE — PROGRESS NOTE ADULT - ASSESSMENT
39 year old male with alcohol abuse (now and treatment) and withdrawal now noted to have demyelinating lesions on MRI. Patient underwent a work up by his outpatient neurologist, Dr. Sam, which revealed ARELIS 1:320 and SSB 1.5  Patient unable to be seen by me today as he was at IR undergoing a lumbar puncture. Will see him tomorrow to obtain further history. Patient admitted 9/17 for an LP and pulse steroids.   No sicca on history though his exam does demonstrate lacrimal enlargement and a dry mouth.   LP only with slight elevated IgG in s/o demyelinating lesions   His labs are not suggestive at this time of any active disease, with no protein gap or lymphopenia/leukopenia.     Awaiting the following: IgA/IgG/IgM, C3/C4, and re check ARELIS, ssa/ssb, COREY, dsDNA, IgG4 subtypes, ACE, 25OHD, 1,25OHD  In order to make a definitive diagnosis of sjogren's prior to committing him to long term immunosuppressive therapy for treatment of these demyelinating lesions secondary to sjogren's, would suggest a lip biopsy to obtain a focus score   OK to continue pulse for treatment of demyelinating lesions on MRI if no concern for infectious cause, but at this time would not make a presumptive diagnosis of Sjogren's syndrome based on the available data. Would recommend a lip biopsy going forward.     Call with questions

## 2019-09-18 NOTE — DISCHARGE NOTE PROVIDER - NSDCFUADDAPPT_GEN_ALL_CORE_FT
Please follow up with Dr. Flowers (rheumatologist) on Sept, 30th at 920am.     Please call Dr. Alex (neurologist) at [phone] to schedule an appointment.

## 2019-09-18 NOTE — PROGRESS NOTE ADULT - PROBLEM SELECTOR PLAN 1
Incidental lesions on CT at OSH, with subsequent findings on MRI suspicous for demyelinating process. Has been following with Dr. Sam as outpatient neurologist.   - IV Solu-medrol 1g Q24h (Day 2/3)  - Neurology (Dr. Sam) following as inpatient  - IR performed LP- Culture negative, CPR negative, glucose and protein WNL  - Rheumatology consulted  - Rheum labs ordered, f/u on results Incidental lesions on CT at OSH, with subsequent findings on MRI suspicous for demyelinating process. Has been following with Dr. Sam as outpatient neurologist.   - IV Solu-medrol 1g Q24h (Day 2/3)  - Neurology (Dr. Sam) following as inpatient  - IR performed LP- Culture negative, PCR negative, glucose and protein WNL  - Rheumatology consulted  - Rheum labs ordered, f/u on results

## 2019-09-18 NOTE — PROGRESS NOTE ADULT - PROBLEM SELECTOR PLAN 2
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 Could no give exact date or name of fracture. He was seen at [facility] and had a boot placed, no surgical intervention.  - f/u foot and ankle xrays from [facility]   - consult orthopedics as outpatient

## 2019-09-18 NOTE — DISCHARGE NOTE PROVIDER - NSDCCPCAREPLAN_GEN_ALL_CORE_FT
PRINCIPAL DISCHARGE DIAGNOSIS  Diagnosis: MS (multiple sclerosis)  Assessment and Plan of Treatment: You were found to have subclinical multiple sclerosis. This disease is caused by your own body attacking some of the cells in your brain. You have PRINCIPAL DISCHARGE DIAGNOSIS  Diagnosis: MS (multiple sclerosis)  Assessment and Plan of Treatment: You were found to have subclinical multiple sclerosis. This disease is caused by your own body attacking some of the cells in your brain. You have not had any episodes of multiple sclerosis (change in sensation, muscle strength or other bodily function) so this is considered subclincial multiple slcerosis. You will be on a 7 day taper of steroids ending on 9/26.

## 2019-09-18 NOTE — PROGRESS NOTE ADULT - SUBJECTIVE AND OBJECTIVE BOX
Neurology Follow up note    Name  GAYATRI GREGORIO    HPI:  Mr. Gregorio is a 38YO male with PMH of L jaw fracture and wiring ~2012 and prior alcohol abuse (now in treatment) with withdrawal and 1 seizure who presented to the ED after being sent by Dr. Sam who saw him outpatient for brain lesions incidentally found on head CT in [facility] for a L foot fracture August 2019. 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 Other than the L foot fracture, he denied weakness, sensory loss, tingling, numbness, changes in vision, double vision, changes with urination, changes in mood, or any other symptoms. Endorsed mild nausea for the past 2-3 days. Otherwise ROS similarly negative.    In the ED VS T 98F, HR 73, /71, RR 18 with SpO2 98 on RA  Labs were all within normal limits.    He was started on 1g IV solumedrol and admitted for inpatient LP and workup. (17 Sep 2019 12:04)      Interval History - s/p LP - no new neuro symptoms        REVIEW OF SYSTEMS    Vital Signs Last 24 Hrs  T(C): 36.4 (18 Sep 2019 09:25), Max: 36.7 (17 Sep 2019 12:42)  T(F): 97.5 (18 Sep 2019 09:25), Max: 98 (17 Sep 2019 12:42)  HR: 83 (18 Sep 2019 09:25) (73 - 98)  BP: 108/51 (18 Sep 2019 09:25) (108/51 - 133/71)  BP(mean): --  RR: 16 (18 Sep 2019 09:25) (16 - 18)  SpO2: 96% (18 Sep 2019 09:25) (94% - 96%)    Physical Exam-     Mental Status- awake and alert     Cranial Nerves-nl    Gait and station-nl    Motor-nl    Reflexes-nl    Sensation-nl    Coordination-nl    Vascular -nl    Medications  acetaminophen   Tablet .. 650 milliGRAM(s) Oral every 6 hours PRN  methylPREDNISolone sodium succinate IVPB 1000 milliGRAM(s) IV Intermittent daily  nicotine  Polacrilex Gum 2 milliGRAM(s) Oral three times a day PRN      Lab      Radiology    Assessment- Await LP results and rheumatology     Plan R/O MS

## 2019-09-18 NOTE — PROGRESS NOTE ADULT - PROBLEM SELECTOR PLAN 4
1) PCP - no PCP at this time  Contacted on Admission:  2) Date of Contact with PCP:  3) PCP Contacted at Discharge: (Y/N)  4) Summary of Handoff Given to PCP:   5) Post-Discharge Appointment Date and Location:

## 2019-09-18 NOTE — DISCHARGE NOTE PROVIDER - NSDCFUSCHEDAPPT_GEN_ALL_CORE_FT
GAYATRI ZEE ; 10/07/2019 ; [facility] [address] GAYATRI ZEE ; 09/30/2019 ; [facility] [address]  GAYATRI ZEE ; 10/07/2019 ; [facility] [address]

## 2019-09-18 NOTE — DISCHARGE NOTE PROVIDER - HOSPITAL COURSE
Patient is 38 yo M with past medical history of L jaw fx and wiring in 2012 and prior EtOH abuse (now in treatment) with withdrawal and one seizure and a foot fracture in August 2019.    Presented from Dr. Mendel's office after incidental brain lesions found on CTH at [facility], sent by Dr. Mendel for lumbar puncture and further workup.    Problem List/Main Diagnoses (system-based):         NEURO:    #R/O CNS demyelinating disorder    -Findings on CTH at [facility] found incidental lesions, f/u MRI showed lesions suspicious for a demyelinating process    -LP performed and found normal glucose, protein, negative PCR and negative culture    -Rheumatology testing found slightly elevated IgG and an increased kappa/lambda free chain ratio         MSK    #Foot fx    -Pt already in walking boot and was treated at [facility]         Inpatient treatment course: Patient admitted after incidental findings on CTH and MRI, sent to [facility] by Dr. Mendel. Patient has remained asymptomatic during his course. A lumbar puncture was performed, pertinent results as above. An extensive rheumatologic workup was done with pertinent results above. Patient was given a total of three-1000mg IV infusions of solu-medrol for swelling management.      New medications:     Labs to be followed outpatient:     Exam to be followed outpatient: Patient is 40 yo M with past medical history of L jaw fx and wiring in 2012 and prior EtOH abuse (now in treatment) with withdrawal and one seizure and a foot fracture in August 2019.    Presented from Dr. Mendel's office after incidental brain lesions found on CTH at [facility], sent by Dr. Mendel for lumbar puncture and further workup.    Problem List/Main Diagnoses (system-based):         NEURO:    #Subclinical MS    -Findings on CTH at [facility] found incidental lesions, f/u MRI showed lesions suspicious for a demyelinating process    -LP performed and found normal glucose, protein, negative PCR and negative culture. Oligoclonal bands were present on the lumbar tap.     -Rheumatology testing found slightly elevated IgG and an increased kappa/lambda free chain ratio as well as Anti SSB antibody, indicating the possibility of a Sjogren's diagnosis. Will need further workup for Sjogren's.           MSK    #Foot fx    -Pt already in walking boot and was treated at [facility]         Inpatient treatment course: Patient admitted after incidental findings on CTH and MRI, sent to [facility] by Dr. Mendel. Patient has remained asymptomatic during his course. A lumbar puncture was performed, pertinent results as above. An extensive rheumatologic workup was done with pertinent results above. Patient was given a total of three-1000mg IV infusions of solu-medrol for swelling management. It was found on lumbar tap that the    New medications: Prednisone taper ([id number])    Labs to be followed outpatient: Antibody panel and other rheumatological workup    Exam to be followed outpatient: Schirmer's test Patient is 40 yo M with past medical history of L jaw fx and wiring in 2012 and prior EtOH abuse (now in treatment) with withdrawal and one seizure and a foot fracture in August 2019.    Presented from Dr. Mendel's office after incidental brain lesions found on CTH at [facility], sent by Dr. Mendel for lumbar puncture and further workup.    Problem List/Main Diagnoses (system-based):         NEURO:    #Subclinical MS    -Findings on CTH at [facility] found incidental lesions, f/u MRI showed lesions suspicious for a demyelinating process    -LP performed and found normal glucose, protein, negative PCR and negative culture. Oligoclonal bands were present on the lumbar tap.     -Rheumatology testing found slightly elevated IgG and an increased kappa/lambda free chain ratio as well as Anti SSB antibody, indicating the possibility of a Sjogren's diagnosis. Will need further workup for Sjogren's.           MSK    #Foot fx    -Pt already in walking boot and was treated at [facility]         Inpatient treatment course: Patient admitted after incidental findings on CTH and MRI, sent to [facility] by Dr. Mendel. Patient has remained asymptomatic during his course. A lumbar puncture was performed, pertinent results as above. An extensive rheumatologic workup was done with pertinent results above. Patient was given a total of three-1000mg IV infusions of solu-medrol for swelling management. Oligoclonal bands were found on the lumbar puncture as well as increased IgG pointing towards MS. Since the patient has never experienced any episodes this is most likely subclinical MS.         New medications: Prednisone taper ([id number])    Labs to be followed outpatient: Antibody panel and other rheumatological workup    Exam to be followed outpatient: Schirmer's test

## 2019-09-18 NOTE — PROGRESS NOTE ADULT - SUBJECTIVE AND OBJECTIVE BOX
39 year old male with alcohol abuse (now and treatment) and withdrawal now noted to have demyelinating lesions on MRI. Patient underwent a work up by his outpatient neurologist, Dr. Sam, which revealed ARELIS 1:320 and SSB 1.5  Patient 9/17 today for an LP and pulse steroids.     Patient denies sicca symptoms  Further denies: photosensitive rashes, alopecia, arthritis, oral ulcers, history of pleuropericarditis, proteinuria/hematuria, cytopenias, unexplained fevers or weight loss, focal numbness or weakness.    VSS  Physical exam notable for the following pertinent positives/negatives:   Comfortable, NAD  No visible rashes  Dry mouth without any pooling of saliva, no parotid enlargement   Lacrimal enlargement, white sclera  CV RRR no mrg  Resp CTAB  GI soft, non tender, no HSM   No edema     Data reviewed, notable for:  CBC WNL   CMP WNL   TP 8.3 Alb 4.6   LFTs WNL   LP elevated IgG  MRI C Spine: foci of abnormal T2 prolongation in the intramedullary intradural cervical spinal cord at the C2 level inferiorly and mid C4 level suggestive of demyelinating disease such as multiple sclerosis. GERRY.

## 2019-09-18 NOTE — PROGRESS NOTE ADULT - PROBLEM SELECTOR PLAN 3
F: none  E: replete as needed for K <4, Mg <2  N: Regular diet   GI ppx: N/A  VTE/DVT ppx: N/A  Pain: tylenol PRN  Code status: Full code  Dispo: GILBERTO

## 2019-09-19 ENCOUNTER — TRANSCRIPTION ENCOUNTER (OUTPATIENT)
Age: 39
End: 2019-09-19

## 2019-09-19 VITALS
DIASTOLIC BLOOD PRESSURE: 56 MMHG | SYSTOLIC BLOOD PRESSURE: 128 MMHG | OXYGEN SATURATION: 96 % | TEMPERATURE: 98 F | RESPIRATION RATE: 18 BRPM | HEART RATE: 74 BPM

## 2019-09-19 PROBLEM — Z78.9 OTHER SPECIFIED HEALTH STATUS: Chronic | Status: ACTIVE | Noted: 2019-09-17

## 2019-09-19 LAB
ANION GAP SERPL CALC-SCNC: 8 MMOL/L — SIGNIFICANT CHANGE UP (ref 5–17)
ANTI-RIBONUCLEAR PROTEIN: 0.5 AI — SIGNIFICANT CHANGE UP
BUN SERPL-MCNC: 16 MG/DL — SIGNIFICANT CHANGE UP (ref 7–23)
C3 SERPL-MCNC: 126 MG/DL — SIGNIFICANT CHANGE UP (ref 81–157)
C4 SERPL-MCNC: 24 MG/DL — SIGNIFICANT CHANGE UP (ref 13–39)
CALCIUM SERPL-MCNC: 9 MG/DL — SIGNIFICANT CHANGE UP (ref 8.4–10.5)
CHLORIDE SERPL-SCNC: 106 MMOL/L — SIGNIFICANT CHANGE UP (ref 96–108)
CO2 SERPL-SCNC: 25 MMOL/L — SIGNIFICANT CHANGE UP (ref 22–31)
CREAT SERPL-MCNC: 0.9 MG/DL — SIGNIFICANT CHANGE UP (ref 0.5–1.3)
DSDNA AB FLD-ACNC: 1.3 AI — HIGH
ENA SM AB FLD QL: <0.2 AI — SIGNIFICANT CHANGE UP
ENA SS-A AB FLD IA-ACNC: 0.4 AI — SIGNIFICANT CHANGE UP
GLUCOSE SERPL-MCNC: 128 MG/DL — HIGH (ref 70–99)
HCT VFR BLD CALC: 41.5 % — SIGNIFICANT CHANGE UP (ref 39–50)
HGB BLD-MCNC: 13.5 G/DL — SIGNIFICANT CHANGE UP (ref 13–17)
IGG SERPL-MCNC: 1673 MG/DL — HIGH (ref 700–1600)
IGG1 SER-MCNC: 1242 MG/DL — HIGH (ref 248–810)
IGG2 SER-MCNC: 377 MG/DL — SIGNIFICANT CHANGE UP (ref 130–555)
IGG3 SER-MCNC: 105 MG/DL — HIGH (ref 15–102)
IGG4 SER-MCNC: 37 MG/DL — SIGNIFICANT CHANGE UP (ref 2–96)
MAGNESIUM SERPL-MCNC: 2.2 MG/DL — SIGNIFICANT CHANGE UP (ref 1.6–2.6)
MCHC RBC-ENTMCNC: 29.3 PG — SIGNIFICANT CHANGE UP (ref 27–34)
MCHC RBC-ENTMCNC: 32.5 GM/DL — SIGNIFICANT CHANGE UP (ref 32–36)
MCV RBC AUTO: 90.2 FL — SIGNIFICANT CHANGE UP (ref 80–100)
NON-GYNECOLOGICAL CYTOLOGY STUDY: SIGNIFICANT CHANGE UP
NRBC # BLD: 0 /100 WBCS — SIGNIFICANT CHANGE UP (ref 0–0)
OLIGOCLONAL BANDS CSF ELPH-IMP: PRESENT
PLATELET # BLD AUTO: 255 K/UL — SIGNIFICANT CHANGE UP (ref 150–400)
POTASSIUM SERPL-MCNC: 4.3 MMOL/L — SIGNIFICANT CHANGE UP (ref 3.5–5.3)
POTASSIUM SERPL-SCNC: 4.3 MMOL/L — SIGNIFICANT CHANGE UP (ref 3.5–5.3)
RBC # BLD: 4.6 M/UL — SIGNIFICANT CHANGE UP (ref 4.2–5.8)
RBC # FLD: 12.5 % — SIGNIFICANT CHANGE UP (ref 10.3–14.5)
SODIUM SERPL-SCNC: 139 MMOL/L — SIGNIFICANT CHANGE UP (ref 135–145)
VDRL CSF-TITR: NEGATIVE — SIGNIFICANT CHANGE UP
WBC # BLD: 15.63 K/UL — HIGH (ref 3.8–10.5)
WBC # FLD AUTO: 15.63 K/UL — HIGH (ref 3.8–10.5)

## 2019-09-19 PROCEDURE — 99232 SBSQ HOSP IP/OBS MODERATE 35: CPT

## 2019-09-19 RX ADMIN — Medication 516 MILLIGRAM(S): at 12:12

## 2019-09-19 NOTE — PROGRESS NOTE ADULT - SUBJECTIVE AND OBJECTIVE BOX
39 year old male with alcohol abuse (now and treatment) and withdrawal now noted to have demyelinating lesions on MRI. Patient underwent a work up by his outpatient neurologist, Dr. Sam, which revealed ARELIS 1:320 and SSB 1.5  Patient 9/17 today for an LP and pulse steroids.     Feels well today  S/p 3 day pulse of steroids  CSF + for oligoclonal bands     VSS  Physical exam notable for the following pertinent positives/negatives:   Comfortable, NAD  No visible rashes  Dry mouth without any pooling of saliva, no parotid enlargement   Lacrimal enlargement, white sclera  CV RRR no mrg  Resp CTAB  GI soft, non tender, no HSM   No edema     Data reviewed, notable for:  CBC WNL   CMP WNL   TP 8.3 Alb 4.6   LFTs WNL   LP elevated IgG  MRI C Spine: foci of abnormal T2 prolongation in the intramedullary intradural cervical spinal cord at the C2 level inferiorly and mid C4 level suggestive of demyelinating disease such as multiple sclerosis. GERRY.

## 2019-09-19 NOTE — DISCHARGE NOTE NURSING/CASE MANAGEMENT/SOCIAL WORK - NSDCPEEMAIL_GEN_ALL_CORE
[facility] for Tobacco Control email [email or web address]

## 2019-09-19 NOTE — PHYSICAL THERAPY INITIAL EVALUATION ADULT - GENERAL OBSERVATIONS, REHAB EVAL
Received semi-Bee's position in bed, eating breakfast with +heplock, on room air, in no apparent distress. Patient appears to be resting comfortably in bed

## 2019-09-19 NOTE — PHYSICAL THERAPY INITIAL EVALUATION ADULT - THERAPY FREQUENCY, PT EVAL
DC PT - patient displays independence for all functional mobility and ADLs including bed mobility, transfers, ambulation and stair negotiation without assistive device or physical external assistance provided. Able to self-don controlled-ankle motion boot and displays excellent dynamic stability with all aforementioned mobility. Patient is free to ambulate with nursing staff as patient is independent without acute inpatient skilled PT needs. DC PT

## 2019-09-19 NOTE — DISCHARGE NOTE NURSING/CASE MANAGEMENT/SOCIAL WORK - NSDCPEWEB_GEN_ALL_CORE
[facility] for Tobacco Control website --- [email or web address] website --- [email or web address]

## 2019-09-19 NOTE — PHYSICAL THERAPY INITIAL EVALUATION ADULT - CRITERIA FOR SKILLED THERAPEUTIC INTERVENTIONS
anticipated discharge recommendation/risk reduction/prevention/functional limitations in following categories/therapy frequency/rehab potential/impairments found

## 2019-09-19 NOTE — PROGRESS NOTE ADULT - REASON FOR ADMISSION
w/u for MS/demyelinating d/o
w/u for MS/demyelinating d/o
demyelinating disease
w/u for MS/demyelinating d/o

## 2019-09-19 NOTE — PHYSICAL THERAPY INITIAL EVALUATION ADULT - GAIT DEVIATIONS NOTED, PT EVAL
decreased stance time on LLE; increased hip flexion on LLE to compensate for decreased dorsiflexion secondary to CAM boot

## 2019-09-19 NOTE — PHYSICAL THERAPY INITIAL EVALUATION ADULT - DIAGNOSIS, PT EVAL
Patient is willing and motivated to participate in physical therapy and would like to continue healing Practice Pattern 5D: Impaired Motor Function and Sensory Integrity Associated with Nonprogressive Disorders of the Central Nervous System—Acquired in Adolescence or Adulthood

## 2019-09-19 NOTE — DISCHARGE NOTE NURSING/CASE MANAGEMENT/SOCIAL WORK - NSDCFUADDAPPT_GEN_ALL_CORE_FT
Please follow up with Dr. Flowers (rheumatologist) on Sept, 30th at 920am.     Please call Dr. Alex (neurologist) at [phone] to schedule an appointment.

## 2019-09-19 NOTE — PHYSICAL THERAPY INITIAL EVALUATION ADULT - ADDITIONAL COMMENTS
Patient lives alone in an elevator apartment building with no steps to enter. Prior to admission, patient was independent for all functional mobility and ADLs. Prior to admission, patient was ambulating with his L foot in a CAM boot due to calcaneus fracture in later 8/19

## 2019-09-19 NOTE — PROGRESS NOTE ADULT - ASSESSMENT
39 year old male with alcohol abuse (now and treatment) and withdrawal now noted to have demyelinating lesions on MRI. Patient underwent a work up by his outpatient neurologist, Dr. Sam, which revealed ARELIS 1:320 and SSB 1.5  Patient unable to be seen by me today as he was at IR undergoing a lumbar puncture. Will see him tomorrow to obtain further history. Patient admitted 9/17 for an LP and pulse steroids.   No sicca on history though his exam does demonstrate lacrimal enlargement and a dry mouth.   LP only with slight elevated IgG in s/o demyelinating lesions   His labs are not suggestive at this time of any active disease, with no protein gap or lymphopenia/leukopenia.     Awaiting additional serologies, will follow up    S/p pulse and transition to high dose steroids per Dr. Sam   Oligoclonal bands in CSF, thus likely a diagnosis of MS and concomitant Sjogren's less likely   Patient can follow up with me as outpatient, consider lip biopsy pending neuro eval     Patient should follow up with me 1-2 weeks after discharge  Case d/w Dr. Sam   Call with questions

## 2019-09-19 NOTE — PHYSICAL THERAPY INITIAL EVALUATION ADULT - PATIENT/FAMILY/SIGNIFICANT OTHER GOALS STATEMENT, PT EVAL
Patient is willing and motivated to participate in physical therapy Patient is willing and motivated to participate in physical therapy and would like to continue healing

## 2019-09-19 NOTE — PROGRESS NOTE ADULT - SUBJECTIVE AND OBJECTIVE BOX
PT. seen and examined at bed side.    Case discussed with medical team.    Case discussed with Consultants.    Orders reviewed.    Plan:as per orders.

## 2019-09-19 NOTE — PROGRESS NOTE ADULT - SUBJECTIVE AND OBJECTIVE BOX
Neurology Follow up note    Name  GAYATRI GREGORIO    HPI:  Mr. Gregorio is a 38YO male with PMH of L jaw fracture and wiring ~2012 and prior alcohol abuse (now in treatment) with withdrawal and 1 seizure who presented to the ED after being sent by Dr. Sam who saw him outpatient for brain lesions incidentally found on head CT in [facility] for a L foot fracture August 2019. 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 Other than the L foot fracture, he denied weakness, sensory loss, tingling, numbness, changes in vision, double vision, changes with urination, changes in mood, or any other symptoms. Endorsed mild nausea for the past 2-3 days. Otherwise ROS similarly negative.    In the ED VS T 98F, HR 73, /71, RR 18 with SpO2 98 on RA  Labs were all within normal limits.    He was started on 1g IV solumedrol and admitted for inpatient LP and workup. (17 Sep 2019 12:04)      Interval History - no change in the neurological symptoms - no new weakness, numbness or tingling         REVIEW OF SYSTEMS    Vital Signs Last 24 Hrs  T(C): 36.3 (19 Sep 2019 08:58), Max: 37.1 (18 Sep 2019 20:32)  T(F): 97.3 (19 Sep 2019 08:58), Max: 98.8 (18 Sep 2019 20:32)  HR: 78 (19 Sep 2019 08:58) (72 - 88)  BP: 121/70 (19 Sep 2019 08:58) (121/70 - 160/64)  BP(mean): --  RR: 18 (19 Sep 2019 08:58) (16 - 18)  SpO2: 96% (19 Sep 2019 08:58) (94% - 96%)    Physical Exam-     Mental Status- awake and alert     Cranial Nerves- full EOM    Gait and station- nl    Motor- nl    Reflexes- intact    Sensation- no sensory level    Coordination- no tremors    Vascular - no bruits    Medications  acetaminophen   Tablet .. 650 milliGRAM(s) Oral every 6 hours PRN  methylPREDNISolone sodium succinate IVPB 1000 milliGRAM(s) IV Intermittent daily  nicotine  Polacrilex Gum 2 milliGRAM(s) Oral three times a day PRN      Lab      Radiology    Assessment- Likely MS - he will FU with Dr Valles    R/O sjogrens - FU with Dr Flowers    Plan Tapering dose of steroids

## 2019-09-19 NOTE — DISCHARGE NOTE NURSING/CASE MANAGEMENT/SOCIAL WORK - PATIENT PORTAL LINK FT
You can access the FollowMyHealth Patient Portal offered by [facility] by registering at the following website: [email or web address]. By joining [facility]’s FollowMyHealth portal, you will also be able to view your health information using other applications (apps) compatible with our system.

## 2019-09-19 NOTE — PHYSICAL THERAPY INITIAL EVALUATION ADULT - MODALITIES TREATMENT COMMENTS
FIM locomotion 7; FIM stairs 7; smile symmetric; tongue protrusion midline; cheek puff symmetric; eyebrow raise symmetric; eyes opening/closing intact; facial sensation V1-3 intact bilaterally; hearing intact to finger rub bilaterally; shoulder shrug 5/5 bilaterally; -dysdiadochokinesia, +Babinski L foot; -Babinski R foot

## 2019-09-19 NOTE — PHYSICAL THERAPY INITIAL EVALUATION ADULT - PERTINENT HX OF CURRENT PROBLEM, REHAB EVAL
Patient is a 39 year old M with PMH of L jaw fracture and wiring ~2012 and prior alcohol abuse (now in treatment) with withdrawal and 1 seizure who presented to the ED after being sent by Dr. Sam who saw him outpatient for brain lesions incidentally found on head CT in [facility] for a L foot fracture August 2019.

## 2019-09-20 LAB
ACE SERPL-CCNC: 41 U/L — SIGNIFICANT CHANGE UP (ref 14–82)
ACE SERPL-CCNC: 43 U/L — SIGNIFICANT CHANGE UP (ref 14–82)
DSDNA AB SER-ACNC: <12 IU/ML — SIGNIFICANT CHANGE UP
DSDNA AB SER-ACNC: <12 IU/ML — SIGNIFICANT CHANGE UP
IGG SERPL-MCNC: 1469 MG/DL — SIGNIFICANT CHANGE UP (ref 700–1600)
IGG1 SER-MCNC: 1060 MG/DL — HIGH (ref 248–810)
IGG2 SER-MCNC: 326 MG/DL — SIGNIFICANT CHANGE UP (ref 130–555)
IGG3 SER-MCNC: 92 MG/DL — SIGNIFICANT CHANGE UP (ref 15–102)
IGG4 SER-MCNC: 27.3 MG/DL — SIGNIFICANT CHANGE UP (ref 2.4–121)
IGG4 SER-MCNC: 33 MG/DL — SIGNIFICANT CHANGE UP (ref 2–96)

## 2019-09-22 DIAGNOSIS — F10.239 ALCOHOL DEPENDENCE WITH WITHDRAWAL, UNSPECIFIED: ICD-10-CM

## 2019-09-22 DIAGNOSIS — Z87.891 PERSONAL HISTORY OF NICOTINE DEPENDENCE: ICD-10-CM
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
## 2019-09-22 DIAGNOSIS — G37.9 DEMYELINATING DISEASE OF CENTRAL NERVOUS SYSTEM, UNSPECIFIED: ICD-10-CM

## 2019-09-22 DIAGNOSIS — R53.1 WEAKNESS: ICD-10-CM

## 2019-09-22 DIAGNOSIS — G35 MULTIPLE SCLEROSIS: ICD-10-CM
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
## 2019-09-22 DIAGNOSIS — S92.902D UNSPECIFIED FRACTURE OF LEFT FOOT, SUBSEQUENT ENCOUNTER FOR FRACTURE WITH ROUTINE HEALING: ICD-10-CM

## 2019-09-22 LAB
ANA PAT FLD IF-IMP: ABNORMAL
ANA PAT FLD IF-IMP: ABNORMAL
ANA TITR SER: ABNORMAL
ANA TITR SER: ABNORMAL

## 2019-09-23 LAB
WNV RNA SPEC QL NAA+PROBE: SIGNIFICANT CHANGE UP
WNV RNA SPEC QL NAA+PROBE: SIGNIFICANT CHANGE UP

## 2019-09-24 LAB — MISCELLANEOUS TEST NAME: SIGNIFICANT CHANGE UP

## 2019-09-27 ENCOUNTER — LABORATORY RESULT (OUTPATIENT)
Age: 39
End: 2019-09-27

## 2019-09-27 ENCOUNTER — APPOINTMENT (OUTPATIENT)
Dept: NEUROLOGY | Facility: CLINIC | Age: 39
End: 2019-09-27
Payer: MEDICAID

## 2019-09-27 VITALS
SYSTOLIC BLOOD PRESSURE: 125 MMHG | TEMPERATURE: 98.4 F | BODY MASS INDEX: 29.62 KG/M2 | OXYGEN SATURATION: 98 % | HEIGHT: 69 IN | DIASTOLIC BLOOD PRESSURE: 79 MMHG | HEART RATE: 79 BPM | WEIGHT: 200 LBS

## 2019-09-27 PROCEDURE — 99215 OFFICE O/P EST HI 40 MIN: CPT

## 2019-09-27 PROCEDURE — 99354: CPT

## 2019-09-27 NOTE — HISTORY OF PRESENT ILLNESS
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 Went to ED at [facility], had CT of foot and also of CTH because concern for head trauma, which patient denies. CTH showed abnormalities which led to MRI brain showing lesions. Saw Dr. Sam. Got spine MRIs also showing lesions. Then admitted to [facility] for 3d of IVMP and LP. Pt remain asymptomatic.\par \par ROS/Current Sx:\par no sx.\par \par PMHX:\par seizure - 2008 and 2018, thought both to be related to etoh withdrawal when he stopped abruptly.\par alcoholism - sober since 2018.\par \par MEDS:\par vitC\par MV\par \par ALL: nkda\par \par SHx: past heavy etoh, now in rehab center and sober X1y. stopped tob 2 months ago. no drugs. used to be in construction, now working in kitchen.\par \par FHx: no MS, no neuro.\par \par Vitals: unremarkable\par \par Exam:\par \par AO3.  Normally conversant.  Follows commands, names, and repeats.  Good attention.\par \par PERRL, VFF, EOMI but with clear BL dissociated nystagmus, no nystagmus, face symmetric, TUP at midline.\par \par Motor: \par                                                 R:                               L:\par Del                                           5                                5\par Bi                                              5                               5\par Tri                                            5                               5\par Wrist Extensors                      5                               5\par Finger abductors                    5                               5\par                                         5                               5 \par \par HF                                           5                               5\par KE                                           5                               5\par KF                                           5                               5\par DF                                           5                               5\par PF                                           5                               5\par \par Tone                                       R                               L\par UE                                          0                                0 \par LE                                          0                                0\par \par Sensory                                RUE                      LUE                 RLE                LLE     \par LT                                           +                            +                      +                   +\par Vib                                          +                            +                      +                   +\par JPS                                         +                            +                      +                   +\par PP                                         +                            +                      +                   +\par Temp                                     +                            +                      +                   +\par \par Reflexes:\par                                              R                             L                            \par Biceps                                  2                             2\par BR                                        2                             2\par Triceps                                2                              2\par Pat                                        2                            2 \par AJ                                        2                             2\par \par TOES                                    F                            F\par \par Coordination:\par                                              R                             L                       \par FTN                                       0                             0 \par AARON                                      0                            0\par HTS                                      0                             0 \par \par Other                                                                          \par  \par Gait: L boot so gait antalgic. \par \par                     Assistance: none\par \par 9/2019\par NMO neg\par ssa wnl, ssb 1.5\par ARELIS 1:320\par lyme neg\par vitD 23.3\par ACE neg\par c3 wnl\par dsdna neg\par sm ab neg\par MOG neg\par \par \par \par CSF 9/2019\par +OCBs\par prot 38\par igg index elevated\par WBC 0\par VDRL neg\par glucose 60\par \par \par AP: 40yo w/ asymptomatic brain and cord lesions that are suggestive of MS. OCBs are positive. While he has been asymptomatic, his exam reveals e/o mild but clear BL INOs. Hence, I recommend rx with DMT. The patient will decide whether he wants to start DMT for MS, and get back to me. Regardless, I highly recommended repeat imaging in 6 months.\par \par - pt to obtain CD of recent brain MRI.\par - start vitD 4ku daily\par - he believes he will start DMT, will check pre-DMT labs and have him come for f/u in 3wks.

## 2019-09-30 ENCOUNTER — APPOINTMENT (OUTPATIENT)
Dept: RHEUMATOLOGY | Facility: CLINIC | Age: 39
End: 2019-09-30
Payer: MEDICAID

## 2019-09-30 VITALS
BODY MASS INDEX: 29.62 KG/M2 | HEIGHT: 69 IN | TEMPERATURE: 98.8 F | SYSTOLIC BLOOD PRESSURE: 153 MMHG | DIASTOLIC BLOOD PRESSURE: 73 MMHG | WEIGHT: 200 LBS | OXYGEN SATURATION: 99 % | HEART RATE: 86 BPM

## 2019-09-30 DIAGNOSIS — R76.8 OTHER SPECIFIED ABNORMAL IMMUNOLOGICAL FINDINGS IN SERUM: ICD-10-CM

## 2019-09-30 DIAGNOSIS — G35 MULTIPLE SCLEROSIS: ICD-10-CM

## 2019-09-30 LAB
M TB IFN-G BLD-IMP: NEGATIVE
MPO AB + PR3 PNL SER: NORMAL
QUANTIFERON TB PLUS MITOGEN MINUS NIL: >10 IU/ML
QUANTIFERON TB PLUS NIL: 0.05 IU/ML
QUANTIFERON TB PLUS TB1 MINUS NIL: 0.01 IU/ML
QUANTIFERON TB PLUS TB2 MINUS NIL: -0.01 IU/ML
VZV AB TITR SER: POSITIVE
VZV IGG SER IF-ACNC: 3220 INDEX

## 2019-09-30 PROCEDURE — 99205 OFFICE O/P NEW HI 60 MIN: CPT | Mod: 25

## 2019-09-30 PROCEDURE — 36415 COLL VENOUS BLD VENIPUNCTURE: CPT

## 2019-10-01 PROBLEM — G35 MULTIPLE SCLEROSIS: Status: ACTIVE | Noted: 2019-08-27

## 2019-10-01 LAB
CULTURE RESULTS: NO GROWTH — SIGNIFICANT CHANGE UP
SPECIMEN SOURCE: SIGNIFICANT CHANGE UP

## 2019-10-01 NOTE — PHYSICAL EXAM
[General Appearance - Well Nourished] : well nourished [General Appearance - In No Acute Distress] : in no acute distress [General Appearance - Alert] : alert [General Appearance - Well-Appearing] : healthy appearing [General Appearance - Well Developed] : well developed [Sclera] : the sclera and conjunctiva were normal [Outer Ear] : the ears and nose were normal in appearance [Examination Of The Oral Cavity] : the lips and gums were normal [Neck Appearance] : the appearance of the neck was normal [Nasal Cavity] : the nasal mucosa and septum were normal [Auscultation Breath Sounds / Voice Sounds] : lungs were clear to auscultation bilaterally [Respiration, Rhythm And Depth] : normal respiratory rhythm and effort [Heart Sounds] : normal S1 and S2 [Heart Rate And Rhythm] : heart rate was normal and rhythm regular [Bowel Sounds] : normal bowel sounds [Abdomen Soft] : soft [Abdomen Tenderness] : non-tender [Cervical Lymph Nodes Enlarged Posterior Bilaterally] : posterior cervical [Supraclavicular Lymph Nodes Enlarged Bilaterally] : supraclavicular [Cervical Lymph Nodes Enlarged Anterior Bilaterally] : anterior cervical [No Spinal Tenderness] : no spinal tenderness [Axillary Lymph Nodes Enlarged Bilaterally] : axillary [FreeTextEntry1] : mild swelling of L ankle without warmth or tenderness  [] : no rash [Oriented To Time, Place, And Person] : oriented to person, place, and time

## 2019-10-01 NOTE — HISTORY OF PRESENT ILLNESS
[FreeTextEntry1] : 39 year old male with demyelinating lesions on his MRI - patient had a ssb of 1.5 and ARELIS 1:320 speckled pattern. \par Admitted for LP and pulse dose steroids. Oligoclonal bands on LP, currently under evaluation by Dr. Alex for MS. Continues on high dose steroids. \par \par No fevers or chills\par Skin rash last week, now resolved - slightly raised bumps on his arms, mostly on the shoulder and chest area. Not pruritic or photosensitive.\par No joint pain \par No sicca \par No history of VTEs \par No RP\par No alopecia\par  [Anorexia] : no anorexia [Weight Loss] : no weight loss [Malaise] : no malaise [Fever] : no fever [Depression] : no depression [Fatigue] : no fatigue [Malar Facial Rash] : no malar facial rash [Skin Lesions] : no lesions [Skin Nodules] : no skin nodules [Cough] : no cough [Oral Ulcers] : no oral ulcers [Shortness of Breath] : no shortness of breath [Dry Mouth] : no dry mouth [Chest Pain] : no chest pain [Falls] : no falls [Difficulty Walking] : no difficulty walking [Difficulty Standing] : no difficulty standing [Myalgias] : no myalgias [Muscle Weakness] : no muscle weakness [Muscle Cramping] : no muscle cramping [Visual Changes] : no visual changes [Muscle Spasms] : no muscle spasms [Eye Redness] : no eye redness [Eye Pain] : no eye pain [Dry Eyes] : no dry eyes

## 2019-10-01 NOTE — ASSESSMENT
[FreeTextEntry1] : 39 year old male with demyelinating lesions on his MRI - patient had a ssb of 1.5 and ARELIS 1:320 speckled pattern. \par Admitted for LP and pulse dose steroids. Oligoclonal bands on LP, currently under evaluation by Dr. Alex for MS. Continues on high dose steroids for treatment of MS, under evaluation by Dr. Alex. \par WIll re check serologies today along with markers of disease activity. Very low suspicion for Sjogren's given lack of other symptoms and alternative explanation for his symptoms.

## 2019-10-02 LAB
ALBUMIN SERPL ELPH-MCNC: 4.5 G/DL
ALP BLD-CCNC: 71 U/L
ALT SERPL-CCNC: 27 U/L
ANION GAP SERPL CALC-SCNC: 15 MMOL/L
AST SERPL-CCNC: 21 U/L
BASOPHILS # BLD AUTO: 0.05 K/UL
BASOPHILS NFR BLD AUTO: 0.6 %
BILIRUB SERPL-MCNC: 0.4 MG/DL
BUN SERPL-MCNC: 12 MG/DL
C3 SERPL-MCNC: 149 MG/DL
C4 SERPL-MCNC: 32 MG/DL
CALCIUM SERPL-MCNC: 9.3 MG/DL
CHLORIDE SERPL-SCNC: 100 MMOL/L
CO2 SERPL-SCNC: 23 MMOL/L
CREAT SERPL-MCNC: 1.01 MG/DL
EOSINOPHIL # BLD AUTO: 0.37 K/UL
EOSINOPHIL NFR BLD AUTO: 4.8 %
GLUCOSE SERPL-MCNC: 81 MG/DL
HCT VFR BLD CALC: 46.5 %
HGB BLD-MCNC: 14.5 G/DL
IGA SER QL IEP: 211 MG/DL
IGG SER QL IEP: 1463 MG/DL
IGM SER QL IEP: 113 MG/DL
IMM GRANULOCYTES NFR BLD AUTO: 0.1 %
LYMPHOCYTES # BLD AUTO: 1.86 K/UL
LYMPHOCYTES NFR BLD AUTO: 24.1 %
MAN DIFF?: NORMAL
MCHC RBC-ENTMCNC: 29.6 PG
MCHC RBC-ENTMCNC: 31.2 GM/DL
MCV RBC AUTO: 94.9 FL
MONOCYTES # BLD AUTO: 0.9 K/UL
MONOCYTES NFR BLD AUTO: 11.6 %
NEUTROPHILS # BLD AUTO: 4.54 K/UL
NEUTROPHILS NFR BLD AUTO: 58.8 %
PLATELET # BLD AUTO: 221 K/UL
POTASSIUM SERPL-SCNC: 3.9 MMOL/L
PROT SERPL-MCNC: 7.3 G/DL
RBC # BLD: 4.9 M/UL
RBC # FLD: 13.4 %
RHEUMATOID FACT SER QL: <10 IU/ML
SODIUM SERPL-SCNC: 138 MMOL/L
WBC # FLD AUTO: 7.73 K/UL

## 2019-10-04 LAB
JCV INDEX: 0.56
STRATIFY JCV ANTIBODY: POSITIVE

## 2019-10-16 LAB
CULTURE RESULTS: SIGNIFICANT CHANGE UP
SPECIMEN SOURCE: SIGNIFICANT CHANGE UP

## 2019-10-31 PROCEDURE — 87798 DETECT AGENT NOS DNA AMP: CPT

## 2019-10-31 PROCEDURE — 87070 CULTURE OTHR SPECIMN AEROBIC: CPT

## 2019-10-31 PROCEDURE — 71045 X-RAY EXAM CHEST 1 VIEW: CPT

## 2019-10-31 PROCEDURE — 82306 VITAMIN D 25 HYDROXY: CPT

## 2019-10-31 PROCEDURE — 85025 COMPLETE CBC W/AUTO DIFF WBC: CPT

## 2019-10-31 PROCEDURE — 88108 CYTOPATH CONCENTRATE TECH: CPT

## 2019-10-31 PROCEDURE — 87483 CNS DNA AMP PROBE TYPE 12-25: CPT

## 2019-10-31 PROCEDURE — 82164 ANGIOTENSIN I ENZYME TEST: CPT

## 2019-10-31 PROCEDURE — 80053 COMPREHEN METABOLIC PANEL: CPT

## 2019-10-31 PROCEDURE — 83735 ASSAY OF MAGNESIUM: CPT

## 2019-10-31 PROCEDURE — 82784 ASSAY IGA/IGD/IGG/IGM EACH: CPT

## 2019-10-31 PROCEDURE — 86038 ANTINUCLEAR ANTIBODIES: CPT

## 2019-10-31 PROCEDURE — 82945 GLUCOSE OTHER FLUID: CPT

## 2019-10-31 PROCEDURE — 62270 DX LMBR SPI PNXR: CPT

## 2019-10-31 PROCEDURE — 85610 PROTHROMBIN TIME: CPT

## 2019-10-31 PROCEDURE — 83916 OLIGOCLONAL BANDS: CPT

## 2019-10-31 PROCEDURE — 89051 BODY FLUID CELL COUNT: CPT

## 2019-10-31 PROCEDURE — 36415 COLL VENOUS BLD VENIPUNCTURE: CPT

## 2019-10-31 PROCEDURE — 87102 FUNGUS ISOLATION CULTURE: CPT

## 2019-10-31 PROCEDURE — 86235 NUCLEAR ANTIGEN ANTIBODY: CPT

## 2019-10-31 PROCEDURE — 85027 COMPLETE CBC AUTOMATED: CPT

## 2019-10-31 PROCEDURE — 77003 FLUOROGUIDE FOR SPINE INJECT: CPT

## 2019-10-31 PROCEDURE — 86160 COMPLEMENT ANTIGEN: CPT

## 2019-10-31 PROCEDURE — 80307 DRUG TEST PRSMV CHEM ANLYZR: CPT

## 2019-10-31 PROCEDURE — 84157 ASSAY OF PROTEIN OTHER: CPT

## 2019-10-31 PROCEDURE — 82787 IGG 1 2 3 OR 4 EACH: CPT

## 2019-10-31 PROCEDURE — 86592 SYPHILIS TEST NON-TREP QUAL: CPT

## 2019-10-31 PROCEDURE — 99285 EMERGENCY DEPT VISIT HI MDM: CPT

## 2019-10-31 PROCEDURE — 82652 VIT D 1 25-DIHYDROXY: CPT

## 2019-10-31 PROCEDURE — 80048 BASIC METABOLIC PNL TOTAL CA: CPT

## 2019-10-31 PROCEDURE — 85730 THROMBOPLASTIN TIME PARTIAL: CPT

## 2019-10-31 PROCEDURE — 86225 DNA ANTIBODY NATIVE: CPT

## 2019-10-31 PROCEDURE — 87205 SMEAR GRAM STAIN: CPT

## 2019-11-07 ENCOUNTER — APPOINTMENT (OUTPATIENT)
Dept: NEUROLOGY | Facility: CLINIC | Age: 39
End: 2019-11-07
Payer: MEDICAID

## 2019-11-07 VITALS
OXYGEN SATURATION: 98 % | BODY MASS INDEX: 30.81 KG/M2 | SYSTOLIC BLOOD PRESSURE: 129 MMHG | HEART RATE: 74 BPM | DIASTOLIC BLOOD PRESSURE: 78 MMHG | HEIGHT: 69 IN | TEMPERATURE: 98.8 F | WEIGHT: 208 LBS

## 2019-11-07 PROCEDURE — 99215 OFFICE O/P EST HI 40 MIN: CPT

## 2019-11-20 NOTE — HISTORY OF PRESENT ILLNESS
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 Went to ED at [facility], had CT of foot and also of CTH because concern for head trauma, which patient denies. CTH showed abnormalities which led to MRI brain showing lesions. Saw Dr. Sam. Got spine MRIs also showing lesions. Then admitted to [facility] for 3d of IVMP and LP. Pt remain asymptomatic.\par \par Subj interval:\par \par Here for DMT discussion.\par \par PMHX:\par seizure - 2008 and 2018, thought both to be related to etoh withdrawal when he stopped abruptly.\par alcoholism - sober since 2018.\par \par MEDS:\par vitC\par MV\par \par \par O:\par \par 9/2019\par NMO neg\par ssa wnl, ssb 1.5\par ARELIS 1:320\par lyme neg\par vitD 23.3\par ACE neg\par c3 wnl\par dsdna neg\par sm ab neg\par MOG neg\par \par CSF 9/2019\par +OCBs\par prot 38\par igg index elevated\par WBC 0\par VDRL neg\par glucose 60\par \par MRI C+T w/o contrast 9/2019 - C2-3, C3, C4, and T12 t2h. \par \par AP: 38yo w/ asymptomatic brain and cord lesions that are suggestive of MS, w mod to sev brain lesion burden and several leanne+ lesions. OCBs are positive. While he has been asymptomatic, his exam reveals e/o mild but clear BL INOs. Hence, I recommend rx with DMT. \par \par Discussed risk/benefit of copaxone, tecfidera, and aubagio. He opts to start copaxone.\par \par - start copaxone\par - cont vitD 4ku daily\par - will try to obtain a brain MRI\par \par

## 2020-02-28 ENCOUNTER — APPOINTMENT (OUTPATIENT)
Dept: NEUROLOGY | Facility: CLINIC | Age: 40
End: 2020-02-28
Payer: MEDICAID

## 2020-02-28 VITALS
OXYGEN SATURATION: 96 % | TEMPERATURE: 98.3 F | WEIGHT: 213 LBS | HEIGHT: 69 IN | SYSTOLIC BLOOD PRESSURE: 151 MMHG | DIASTOLIC BLOOD PRESSURE: 90 MMHG | BODY MASS INDEX: 31.55 KG/M2 | HEART RATE: 83 BPM

## 2020-02-28 PROCEDURE — 99214 OFFICE O/P EST MOD 30 MIN: CPT

## 2020-02-28 NOTE — HISTORY OF PRESENT ILLNESS
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 Went to ED at [facility], had CT of foot and also of CTH because concern for head trauma, which patient denies. CTH showed abnormalities which led to MRI brain showing lesions. Saw Dr. Sam. Got spine MRIs also showing lesions. Then admitted to [facility] for 3d of IVMP and LP. Pt remain asymptomatic.\par \par Subj interval:\par \par No new symptoms.\par \par Trouble going to sleep. Has not exercised X2wks. Naps during the day. \par \par PMHX:\par seizure - 2008 and 2018, thought both to be related to etoh withdrawal when he stopped abruptly.\par alcoholism - sober since 2018.\par \par MEDS:\par vitC\par MV\par \par \par O:\par \par AO3. Normally conversant. Follows commands, names, and repeats. Good attention.\par \par PERRL, VFF, EOMI but with definite BL dissociated nystagmus, no nystagmus, face symmetric, TUP at midline.\par \par Motor: \par    R:  L:\par Del   5  5\par Bi   5  5\par Tri   5  5\par Wrist Extensors  5  5\par Finger abductors  5  5\par    5  5 \par \par HF   5  5\par KE   5  5\par KF   5  5\par DF   5  5\par PF   5  5\par \par Tone   R  L\par UE   0  0 \par LE   0  0\par \par Sensory  RUE  LUE  RLE LLE \par LT   +  +  +  +\par Vib   +  +  +  +\par JPS   +  +  +  +\par PP   +  +  +  +\par Temp   +  +  +  +\par \par Reflexes:\par    R  L  \par Biceps   2  2\par BR   2  2\par Triceps  2  2\par Pat   2  2 \par AJ   2  2\par \par TOES   F  F\par \par Coordination:\par    R  L  \par FTN   0  0 \par AARON   0  0\par HTS   0  0 \par \par Other     \par  \par Gait: normal gait, can heel and toe walk well, mod difficulty with tandem\par \par   Assistance: none\par \par \par 9/2019\par NMO neg\par ssa wnl, ssb 1.5\par ARELIS 1:320\par lyme neg\par vitD 23.3\par ACE neg\par c3 wnl\par dsdna neg\par sm ab neg\par MOG neg\par \par CSF 9/2019\par +OCBs\par prot 38\par igg index elevated\par WBC 0\par VDRL neg\par glucose 60\par \par MRI C+T w/o contrast 9/2019 - C2-3, C3, C4, and T12 t2h. \par \par MRI brain 7/31/19 - mod to sev lesion burden, several leanne+ lesions with a dominant leanne+ R frontal lesion. \par \par AP: 40yo w/ asymptomatic brain and cord lesions that are suggestive of MS, w mod to sev brain lesion burden and several leanne+ lesions. OCBs are positive. While he has been asymptomatic, his exam reveals e/o mild but clear BL INOs. Hence, I recommend rx with DMT. \par \par - start copaxone.\par - cont vitD 4ku daily\par - new brain MRI (6m interval) as per pt request\par - RTC 1m after starting copaxone.

## 2020-06-04 ENCOUNTER — APPOINTMENT (OUTPATIENT)
Dept: NEUROLOGY | Facility: CLINIC | Age: 40
End: 2020-06-04
Payer: MEDICAID

## 2020-06-04 PROCEDURE — 99442: CPT

## 2020-06-04 RX ORDER — VITAMIN K2 90 MCG
125 MCG CAPSULE ORAL
Qty: 30 | Refills: 5 | Status: ACTIVE | COMMUNITY
Start: 2019-09-27 | End: 1900-01-01

## 2020-06-18 ENCOUNTER — OUTPATIENT (OUTPATIENT)
Dept: OUTPATIENT SERVICES | Facility: HOSPITAL | Age: 40
LOS: 1 days | End: 2020-06-18
Payer: MEDICAID

## 2020-06-18 ENCOUNTER — RESULT REVIEW (OUTPATIENT)
Age: 40
End: 2020-06-18

## 2020-06-18 ENCOUNTER — APPOINTMENT (OUTPATIENT)
Dept: MRI IMAGING | Facility: HOSPITAL | Age: 40
End: 2020-06-18
Payer: MEDICAID

## 2020-06-18 DIAGNOSIS — S02.609A FRACTURE OF MANDIBLE, UNSPECIFIED, INITIAL ENCOUNTER FOR CLOSED FRACTURE: Chronic | ICD-10-CM

## 2020-06-18 PROCEDURE — 70553 MRI BRAIN STEM W/O & W/DYE: CPT | Mod: 26

## 2020-06-18 PROCEDURE — 70553 MRI BRAIN STEM W/O & W/DYE: CPT

## 2020-06-18 PROCEDURE — A9585: CPT

## 2021-04-25 ENCOUNTER — EMERGENCY (EMERGENCY)
Facility: HOSPITAL | Age: 41
LOS: 1 days | Discharge: HOME | End: 2021-04-25
Attending: EMERGENCY MEDICINE | Admitting: EMERGENCY MEDICINE
Payer: COMMERCIAL

## 2021-04-25 VITALS
DIASTOLIC BLOOD PRESSURE: 70 MMHG | OXYGEN SATURATION: 98 % | WEIGHT: 186.95 LBS | HEIGHT: 69 IN | SYSTOLIC BLOOD PRESSURE: 143 MMHG | HEART RATE: 76 BPM | RESPIRATION RATE: 16 BRPM | TEMPERATURE: 98 F

## 2021-04-25 VITALS
RESPIRATION RATE: 17 BRPM | DIASTOLIC BLOOD PRESSURE: 84 MMHG | HEART RATE: 60 BPM | OXYGEN SATURATION: 98 % | TEMPERATURE: 99 F | SYSTOLIC BLOOD PRESSURE: 148 MMHG

## 2021-04-25 DIAGNOSIS — Z79.52 LONG TERM (CURRENT) USE OF SYSTEMIC STEROIDS: ICD-10-CM

## 2021-04-25 DIAGNOSIS — Z82.49 FAMILY HISTORY OF ISCHEMIC HEART DISEASE AND OTHER DISEASES OF THE CIRCULATORY SYSTEM: ICD-10-CM

## 2021-04-25 DIAGNOSIS — S02.609A FRACTURE OF MANDIBLE, UNSPECIFIED, INITIAL ENCOUNTER FOR CLOSED FRACTURE: Chronic | ICD-10-CM

## 2021-04-25 DIAGNOSIS — R10.9 UNSPECIFIED ABDOMINAL PAIN: ICD-10-CM

## 2021-04-25 DIAGNOSIS — R31.9 HEMATURIA, UNSPECIFIED: ICD-10-CM

## 2021-04-25 LAB
ALBUMIN SERPL ELPH-MCNC: 4 G/DL — SIGNIFICANT CHANGE UP (ref 3.3–5)
ALP SERPL-CCNC: 62 U/L — SIGNIFICANT CHANGE UP (ref 40–120)
ALT FLD-CCNC: 39 U/L — SIGNIFICANT CHANGE UP (ref 10–45)
ANION GAP SERPL CALC-SCNC: 8 MMOL/L — SIGNIFICANT CHANGE UP (ref 5–17)
AST SERPL-CCNC: 36 U/L — SIGNIFICANT CHANGE UP (ref 10–40)
BASOPHILS # BLD AUTO: 0.03 K/UL — SIGNIFICANT CHANGE UP (ref 0–0.2)
BASOPHILS NFR BLD AUTO: 0.6 % — SIGNIFICANT CHANGE UP (ref 0–2)
BILIRUB SERPL-MCNC: 0.4 MG/DL — SIGNIFICANT CHANGE UP (ref 0.2–1.2)
BUN SERPL-MCNC: 13 MG/DL — SIGNIFICANT CHANGE UP (ref 7–23)
CALCIUM SERPL-MCNC: 9.6 MG/DL — SIGNIFICANT CHANGE UP (ref 8.4–10.5)
CHLORIDE SERPL-SCNC: 103 MMOL/L — SIGNIFICANT CHANGE UP (ref 96–108)
CO2 SERPL-SCNC: 29 MMOL/L — SIGNIFICANT CHANGE UP (ref 22–31)
CREAT SERPL-MCNC: 0.87 MG/DL — SIGNIFICANT CHANGE UP (ref 0.5–1.3)
EOSINOPHIL # BLD AUTO: 0.1 K/UL — SIGNIFICANT CHANGE UP (ref 0–0.5)
EOSINOPHIL NFR BLD AUTO: 2 % — SIGNIFICANT CHANGE UP (ref 0–6)
GLUCOSE SERPL-MCNC: 85 MG/DL — SIGNIFICANT CHANGE UP (ref 70–99)
HCT VFR BLD CALC: 40.6 % — SIGNIFICANT CHANGE UP (ref 39–50)
HGB BLD-MCNC: 13.4 G/DL — SIGNIFICANT CHANGE UP (ref 13–17)
IMM GRANULOCYTES NFR BLD AUTO: 0.2 % — SIGNIFICANT CHANGE UP (ref 0–1.5)
LYMPHOCYTES # BLD AUTO: 2.48 K/UL — SIGNIFICANT CHANGE UP (ref 1–3.3)
LYMPHOCYTES # BLD AUTO: 49.6 % — HIGH (ref 13–44)
MCHC RBC-ENTMCNC: 29.1 PG — SIGNIFICANT CHANGE UP (ref 27–34)
MCHC RBC-ENTMCNC: 33 GM/DL — SIGNIFICANT CHANGE UP (ref 32–36)
MCV RBC AUTO: 88.3 FL — SIGNIFICANT CHANGE UP (ref 80–100)
MONOCYTES # BLD AUTO: 0.62 K/UL — SIGNIFICANT CHANGE UP (ref 0–0.9)
MONOCYTES NFR BLD AUTO: 12.4 % — SIGNIFICANT CHANGE UP (ref 2–14)
NEUTROPHILS # BLD AUTO: 1.76 K/UL — LOW (ref 1.8–7.4)
NEUTROPHILS NFR BLD AUTO: 35.2 % — LOW (ref 43–77)
NRBC # BLD: 0 /100 WBCS — SIGNIFICANT CHANGE UP (ref 0–0)
PLATELET # BLD AUTO: 212 K/UL — SIGNIFICANT CHANGE UP (ref 150–400)
POTASSIUM SERPL-MCNC: 4.3 MMOL/L — SIGNIFICANT CHANGE UP (ref 3.5–5.3)
POTASSIUM SERPL-SCNC: 4.3 MMOL/L — SIGNIFICANT CHANGE UP (ref 3.5–5.3)
PROT SERPL-MCNC: 7.1 G/DL — SIGNIFICANT CHANGE UP (ref 6–8.3)
RBC # BLD: 4.6 M/UL — SIGNIFICANT CHANGE UP (ref 4.2–5.8)
RBC # FLD: 12 % — SIGNIFICANT CHANGE UP (ref 10.3–14.5)
SODIUM SERPL-SCNC: 140 MMOL/L — SIGNIFICANT CHANGE UP (ref 135–145)
WBC # BLD: 5 K/UL — SIGNIFICANT CHANGE UP (ref 3.8–10.5)
WBC # FLD AUTO: 5 K/UL — SIGNIFICANT CHANGE UP (ref 3.8–10.5)

## 2021-04-25 PROCEDURE — 74176 CT ABD & PELVIS W/O CONTRAST: CPT | Mod: 26,MC

## 2021-04-25 PROCEDURE — 85025 COMPLETE CBC W/AUTO DIFF WBC: CPT

## 2021-04-25 PROCEDURE — 36415 COLL VENOUS BLD VENIPUNCTURE: CPT

## 2021-04-25 PROCEDURE — 99284 EMERGENCY DEPT VISIT MOD MDM: CPT | Mod: 25

## 2021-04-25 PROCEDURE — 74176 CT ABD & PELVIS W/O CONTRAST: CPT

## 2021-04-25 PROCEDURE — 80053 COMPREHEN METABOLIC PANEL: CPT

## 2021-04-25 PROCEDURE — 87086 URINE CULTURE/COLONY COUNT: CPT

## 2021-04-25 PROCEDURE — 99285 EMERGENCY DEPT VISIT HI MDM: CPT

## 2021-04-25 PROCEDURE — 81003 URINALYSIS AUTO W/O SCOPE: CPT

## 2021-04-25 NOTE — ED PROVIDER NOTE - PATIENT PORTAL LINK FT
You can access the FollowMyHealth Patient Portal offered by [facility] by registering at the following website: [email or web address]. By joining [employer]’s FollowMyHealth portal, you will also be able to view your health information using other applications (apps) compatible with our system.

## 2021-04-25 NOTE — ED PROVIDER NOTE - OBJECTIVE STATEMENT
40 y/o M w/ no significant PMHx presents to the ED c/o hematuria which began last night and intermittent L flank pain for months. Reports that last night urinated large amount of bloody urine, no clots, was able to fully urinate and then this morning hematuria resolved but the intermittent flank pain persists. Pain has never been severe. Denies fever, chills, n/v, dysuria, penile d/c, testicular pan or swelling or any other acute sx. No hx hernia. No hx kidney stones. Here in ED pt states feeling okay.

## 2021-04-25 NOTE — ED PROVIDER NOTE - CARE PROVIDER_API CALL
NYDIA MENJIVAR  Urology  [address] [id number]  Phone: [phone]  Fax: [phone]  Follow Up Time:

## 2021-04-25 NOTE — ED ADULT TRIAGE NOTE - CHIEF COMPLAINT QUOTE
Pt reports LLQ pain x 1 month, blood in urine since last night, denies fevers, burning with urination, n/v.

## 2021-04-25 NOTE — ED PROVIDER NOTE - CLINICAL SUMMARY MEDICAL DECISION MAKING FREE TEXT BOX
40 y/o M presents w/ hematuria and L flank pain. Will check labs, kidney function, UA, UC and CT Abd/Pelvis for r/o kidney stones. 40 y/o M presents w/ hematuria and L flank pain. Will check labs, kidney function, UA, UC and CT Abd/Pelvis for r/o kidney stones.  Labs, u/a ok, ct without stone ? passed stone.  Discussed with pt if symptoms continue, should follow up with urologist for possible cystoscopy.  Aware to drink plenty of fluids and understands return precautions.

## 2021-04-25 NOTE — ED PROVIDER NOTE - NSFOLLOWUPINSTRUCTIONS_ED_ALL_ED_FT
Follow up with urologist recommended below.    Drink plenty of water.    Return to ED with worsening symptoms, inability to urinate, passing clots in urine, or other concerns.        Hematuria, Adult    Hematuria is blood in the urine. Blood may be visible in the urine, or it may be identified with a test. This condition can be caused by infections of the bladder, urethra, kidney, or prostate. Other possible causes include:  •Kidney stones.      •Cancer of the urinary tract.      •Too much calcium in the urine.      •Conditions that are passed from parent to child (inherited conditions).       •Exercise that requires a lot of energy.      Infections can usually be treated with medicine, and a kidney stone usually will pass through your urine. If neither of these is the cause of your hematuria, more tests may be needed to identify the cause of your symptoms.    It is very important to tell your health care provider about any blood in your urine, even if it is painless or the blood stops without treatment. Blood in the urine, when it happens and then stops and then happens again, can be a symptom of a very serious condition, including cancer. There is no pain in the initial stages of many urinary cancers.      Follow these instructions at home:    Medicines     •Take over-the-counter and prescription medicines only as told by your health care provider.      •If you were prescribed an antibiotic medicine, take it as told by your health care provider. Do not stop taking the antibiotic even if you start to feel better.      Eating and drinking     •Drink enough fluid to keep your urine clear or pale yellow. It is recommended that you drink 3–4 quarts (2.8–3.8 L) a day. If you have been diagnosed with an infection, it is recommended that you drink cranberry juice in addition to large amounts of water.      •Avoid caffeine, tea, and carbonated beverages. These tend to irritate the bladder.      •Avoid alcohol because it may irritate the prostate (men).      General instructions     •If you have been diagnosed with a kidney stone, follow your health care provider's instructions about straining your urine to catch the stone.      •Empty your bladder often. Avoid holding urine for long periods of time.    •If you are female:  •After a bowel movement, wipe from front to back and use each piece of toilet paper only once.      •Empty your bladder before and after sex.        •Pay attention to any changes in your symptoms. Tell your health care provider about any changes or any new symptoms.      •It is your responsibility to get your test results. Ask your health care provider, or the department performing the test, when your results will be ready.      •Keep all follow-up visits as told by your health care provider. This is important.        Contact a health care provider if:    •You develop back pain.      •You have a fever.      •You have nausea or vomiting.      •Your symptoms do not improve after 3 days.      •Your symptoms get worse.        Get help right away if:    •You develop severe vomiting and are unable take medicine without vomiting.      •You develop severe pain in your back or abdomen even though you are taking medicine.      •You pass a large amount of blood in your urine.      •You pass blood clots in your urine.      •You feel very weak or like you might faint.      •You faint.        Summary    •Hematuria is blood in the urine. It has many possible causes.      •It is very important that you tell your health care provider about any blood in your urine, even if it is painless or the blood stops without treatment.      •Take over-the-counter and prescription medicines only as told by your health care provider.      •Drink enough fluid to keep your urine clear or pale yellow.      This information is not intended to replace advice given to you by your health care provider. Make sure you discuss any questions you have with your health care provider.      Document Revised: 05/13/2020 Document Reviewed: 01/20/2018    Elsevier Patient Education © 2021 [employer]

## 2023-03-17 NOTE — END OF VISIT
[>50% of Time Spent on Counseling and Coordination of Care for  ___] : Greater than 50% of the encounter time was spent on counseling and coordination of care for [unfilled] [Time Spent: ___ minutes] : I have spent [unfilled] minutes of face to face time with the patient 15-Mar-2023
